# Patient Record
Sex: MALE
[De-identification: names, ages, dates, MRNs, and addresses within clinical notes are randomized per-mention and may not be internally consistent; named-entity substitution may affect disease eponyms.]

---

## 2020-08-28 ENCOUNTER — HOSPITAL ENCOUNTER (INPATIENT)
Dept: HOSPITAL 88 - ER | Age: 70
LOS: 3 days | Discharge: HOME | DRG: 432 | End: 2020-08-31
Attending: INTERNAL MEDICINE | Admitting: INTERNAL MEDICINE
Payer: MEDICARE

## 2020-08-28 VITALS — SYSTOLIC BLOOD PRESSURE: 119 MMHG | DIASTOLIC BLOOD PRESSURE: 74 MMHG

## 2020-08-28 VITALS — WEIGHT: 214 LBS | BODY MASS INDEX: 29.96 KG/M2 | HEIGHT: 71 IN

## 2020-08-28 VITALS — SYSTOLIC BLOOD PRESSURE: 101 MMHG | DIASTOLIC BLOOD PRESSURE: 63 MMHG

## 2020-08-28 VITALS — DIASTOLIC BLOOD PRESSURE: 63 MMHG | SYSTOLIC BLOOD PRESSURE: 101 MMHG

## 2020-08-28 VITALS — DIASTOLIC BLOOD PRESSURE: 74 MMHG | SYSTOLIC BLOOD PRESSURE: 119 MMHG

## 2020-08-28 DIAGNOSIS — F17.210: ICD-10-CM

## 2020-08-28 DIAGNOSIS — I50.42: ICD-10-CM

## 2020-08-28 DIAGNOSIS — G93.41: ICD-10-CM

## 2020-08-28 DIAGNOSIS — E11.9: ICD-10-CM

## 2020-08-28 DIAGNOSIS — I25.10: ICD-10-CM

## 2020-08-28 DIAGNOSIS — K74.60: Primary | ICD-10-CM

## 2020-08-28 DIAGNOSIS — R18.8: ICD-10-CM

## 2020-08-28 DIAGNOSIS — Z11.59: ICD-10-CM

## 2020-08-28 DIAGNOSIS — F10.21: ICD-10-CM

## 2020-08-28 DIAGNOSIS — Z95.1: ICD-10-CM

## 2020-08-28 DIAGNOSIS — Z79.4: ICD-10-CM

## 2020-08-28 DIAGNOSIS — E80.6: ICD-10-CM

## 2020-08-28 LAB
ALBUMIN SERPL-MCNC: 3 G/DL (ref 3.5–5)
ALBUMIN/GLOB SERPL: 0.7 {RATIO} (ref 0.8–2)
ALP SERPL-CCNC: 86 IU/L (ref 40–150)
ALT SERPL-CCNC: 14 IU/L (ref 0–55)
ANION GAP SERPL CALC-SCNC: 17.2 MMOL/L (ref 8–16)
BACTERIA URNS QL MICRO: (no result) /HPF
BASOPHILS # BLD AUTO: 0.1 10*3/UL (ref 0–0.1)
BASOPHILS NFR BLD AUTO: 1.3 % (ref 0–1)
BILIRUB UR QL: NEGATIVE
BUN SERPL-MCNC: 20 MG/DL (ref 7–26)
BUN/CREAT SERPL: 12 (ref 6–25)
CALCIUM SERPL-MCNC: 8.9 MG/DL (ref 8.4–10.2)
CHLORIDE SERPL-SCNC: 101 MMOL/L (ref 98–107)
CK MB SERPL-MCNC: 1.7 NG/ML (ref 0–5)
CK SERPL-CCNC: 46 IU/L (ref 30–200)
CLARITY UR: CLEAR
CO2 SERPL-SCNC: 25 MMOL/L (ref 22–29)
COLOR UR: YELLOW
DEPRECATED NEUTROPHILS # BLD AUTO: 5.9 10*3/UL (ref 2.1–6.9)
DEPRECATED RBC URNS MANUAL-ACNC: (no result) /HPF (ref 0–5)
EGFRCR SERPLBLD CKD-EPI 2021: 39 ML/MIN (ref 60–?)
EOSINOPHIL # BLD AUTO: 0.2 10*3/UL (ref 0–0.4)
EOSINOPHIL NFR BLD AUTO: 2.8 % (ref 0–6)
EPI CELLS URNS QL MICRO: (no result) /LPF
ERYTHROCYTE [DISTWIDTH] IN CORD BLOOD: 16.8 % (ref 11.7–14.4)
GLOBULIN PLAS-MCNC: 4.1 G/DL (ref 2.3–3.5)
GLUCOSE SERPLBLD-MCNC: 192 MG/DL (ref 74–118)
HCT VFR BLD AUTO: 37 % (ref 38.2–49.6)
HGB BLD-MCNC: 11.4 G/DL (ref 14–18)
HYALINE CASTS #/AREA URNS LPF: (no result) /[LPF] (ref 0–1)
KETONES UR QL STRIP.AUTO: (no result)
LEUKOCYTE ESTERASE UR QL STRIP.AUTO: NEGATIVE
LYMPHOCYTES # BLD: 0.6 10*3/UL (ref 1–3.2)
LYMPHOCYTES NFR BLD AUTO: 7.4 % (ref 18–39.1)
MCH RBC QN AUTO: 26.8 PG (ref 28–32)
MCHC RBC AUTO-ENTMCNC: 30.8 G/DL (ref 31–35)
MCV RBC AUTO: 87.1 FL (ref 81–99)
MONOCYTES # BLD AUTO: 0.6 10*3/UL (ref 0.2–0.8)
MONOCYTES NFR BLD AUTO: 8.5 % (ref 4.4–11.3)
NEUTS SEG NFR BLD AUTO: 79.7 % (ref 38.7–80)
NITRITE UR QL STRIP.AUTO: NEGATIVE
PLATELET # BLD AUTO: 290 X10E3/UL (ref 140–360)
POTASSIUM SERPL-SCNC: 4.2 MMOL/L (ref 3.5–5.1)
PROT UR QL STRIP.AUTO: NEGATIVE
RBC # BLD AUTO: 4.25 X10E6/UL (ref 4.3–5.7)
SODIUM SERPL-SCNC: 139 MMOL/L (ref 136–145)
SP GR UR STRIP: 1.02 (ref 1.01–1.02)
UROBILINOGEN UR STRIP-MCNC: 0.2 MG/DL (ref 0.2–1)
WBC #/AREA URNS HPF: (no result) /HPF (ref 0–5)

## 2020-08-28 PROCEDURE — 36415 COLL VENOUS BLD VENIPUNCTURE: CPT

## 2020-08-28 PROCEDURE — 74177 CT ABD & PELVIS W/CONTRAST: CPT

## 2020-08-28 PROCEDURE — 93005 ELECTROCARDIOGRAM TRACING: CPT

## 2020-08-28 PROCEDURE — 82550 ASSAY OF CK (CPK): CPT

## 2020-08-28 PROCEDURE — 76705 ECHO EXAM OF ABDOMEN: CPT

## 2020-08-28 PROCEDURE — 82553 CREATINE MB FRACTION: CPT

## 2020-08-28 PROCEDURE — 80053 COMPREHEN METABOLIC PANEL: CPT

## 2020-08-28 PROCEDURE — 83540 ASSAY OF IRON: CPT

## 2020-08-28 PROCEDURE — 83605 ASSAY OF LACTIC ACID: CPT

## 2020-08-28 PROCEDURE — 82948 REAGENT STRIP/BLOOD GLUCOSE: CPT

## 2020-08-28 PROCEDURE — 82390 ASSAY OF CERULOPLASMIN: CPT

## 2020-08-28 PROCEDURE — 82728 ASSAY OF FERRITIN: CPT

## 2020-08-28 PROCEDURE — 99284 EMERGENCY DEPT VISIT MOD MDM: CPT

## 2020-08-28 PROCEDURE — 86039 ANTINUCLEAR ANTIBODIES (ANA): CPT

## 2020-08-28 PROCEDURE — 84484 ASSAY OF TROPONIN QUANT: CPT

## 2020-08-28 PROCEDURE — 82746 ASSAY OF FOLIC ACID SERUM: CPT

## 2020-08-28 PROCEDURE — 81001 URINALYSIS AUTO W/SCOPE: CPT

## 2020-08-28 PROCEDURE — 83690 ASSAY OF LIPASE: CPT

## 2020-08-28 PROCEDURE — 87040 BLOOD CULTURE FOR BACTERIA: CPT

## 2020-08-28 PROCEDURE — 80076 HEPATIC FUNCTION PANEL: CPT

## 2020-08-28 PROCEDURE — 84466 ASSAY OF TRANSFERRIN: CPT

## 2020-08-28 PROCEDURE — 85025 COMPLETE CBC W/AUTO DIFF WBC: CPT

## 2020-08-28 PROCEDURE — 82607 VITAMIN B-12: CPT

## 2020-08-28 PROCEDURE — 85045 AUTOMATED RETICULOCYTE COUNT: CPT

## 2020-08-28 RX ADMIN — INSULIN LISPRO SCH UNIT: 100 INJECTION, SOLUTION INTRAVENOUS; SUBCUTANEOUS at 21:00

## 2020-08-28 NOTE — XMS REPORT
Continuity of Care Document

                             Created on: 2020



JUSTIN MACK

External Reference #: [id number]

: 1950

Sex: Male



Demographics





                          Address                   [address] [place]  [address]

 

                          Home Phone                [phone]

 

                          Preferred Language        English

 

                          Marital Status            Unknown

 

                          Mormonism Affiliation     Unknown

 

                          Race                      Unknown

 

                          Ethnic Group              Unknown





Author





                          Author                    [employer]

 JUSTIN Schaffer              [employer]

 [employer]

 

                          Address                   Unknown

 

                          Phone                     Unavailable







Care Team Providers





                    Care Team Member Name Role                Phone

 

                    [employer] Unavailable         Un

available



                                    



Problems

                    



                    Problem                         Status                      

   Onset Date       

                          Classification                         Date Reported  

         

                          Comments                         Source               

     

 

                    Carotid artery disease                         Active       

                    

                          Problem                         2016            

    

                                                    [facility]           

         

 

                    Hypercholesterolemia                         Active         

                    

                          Problem                         2016            

      

                                                    Shaneka Hazel           

         

 

                    Diabetes mellitus                         Active            

                    

                          Problem                         2016            

         

                                                    Shaneka Hazel           

         

 

                    Equivalent angina                         Active            

                    

                          Problem                         2016            

         

                                                    Shaneka Hazel           

         

 

                    CAD, Graft                         Active                   

                    

                    Problem                         2016                  

          

                                        Shaneka Hazel                    

 

                    Smoker                         Active                       

                    

                    Diagnosis                         2016                

              

                                        Shaneka Hazel                    

 

                          DJD (degenerative joint disease) of knee              

           Active         

                                             Diagnosis                         

2016                                                   Shaneka Hazel  

 

                

 

                          Subclavian artery stenosis, left                      

   Active                 

                                             Problem                               

                                                    Shaneka Hazel           

         

 

                    CVA                         Active                          

                    

                    Problem                         2016                  

                 

                                        Shaneka Hazel                    

 

                                        Atherosclerosis of native arteries of th

e extremities with intermittent 

claudication                         Active                                     

                    Problem                         2016                  

        

                                        Shaneka Hazel                    

 

                    Abnormal EKG                         Active                 

                    

                    Problem                         2016                  

        

                                        Shaneka Hazel                    

 

                          Benign hypertensive heart disease                     

    Active                

                                             Problem                              

                                                    Shaneka Hazel           

        



 

                          Long term current use of insulin                      

   Active                 

                                             Diagnosis                         0

2020    

                                                    hSaneka Hazel           

       

 

 

                          Patient unable to exercise                         Act

natanael                       

                                             Diagnosis                         0

2020          

                                                    Shaneka Hazel           

         

 

                          Subclavian arterial stenosis                         A

ctive                     

                                             Diagnosis                         0

2020        

                                                    Shaneka Hazel           

         

 

                          Type 2 diabetes mellitus with unspecified complication

s                         

Active                                                   Diagnosis              

 

                    2020                                                  

Shaneka Hazel                    

 

                          History of CEA (carotid endarterectomy)               

          Active          

                                             Diagnosis                         

2020                                                   Shaneka Hazel  

 

                

 

                          Osteoarthritis of knee, unspecified                   

      Active              

                                             Diagnosis                         0

2020 

                                                    Shaneka Hazel           

    

    

 

                          Nicotine dependence, unspecified, uncomplicated       

                  Active  

                                                    Diagnosis                   

     

                    2020                                                  

Shaneka Hazel   

                

 

                          Pure hypercholesterolemia, unspecified                

         Active           

                                             Diagnosis                         

2020                                                   Shaneka Hazel  

 

                

 

                          Hypertensive heart disease without heart failure      

                   Active 

                                                    Diagnosis                   

    

                    2020                                                  

Shaneka Hazel  

                 

 

                                        Atherosclerosis of coronary artery bypas

s graft of native heart with angina 

pectoris                         Active                                         

                    Problem                         2020                  

            

                                        Shaneka Hazel                    

 

                          Abnormal electrocardiogram [ECG] [EKG]                

         Active           

                                             Diagnosis                         

2020                                                   Shaneka Hazel  

 

                 

 

                          Occlusion and stenosis of bilateral carotid arteries  

                       

Active                                                   Diagnosis              

 

                    2020                                                  

Shaneka Hazel                    

 

                          Acute mitral insufficiency                         Act

natanael                       

                                             Problem                         2020           

                                                    Shaneka Hazel           

         

 

                          History of MI (myocardial infarction)                 

        Active            

                                             Problem                         2020

                                                    Shaneka Hazel           

  

       

 

                    Mitral valve disorders                         Active       

                    

                          Problem                         2020            

   

                                                    Shaneka Hazel           

         

 

                                        Atherosclerosis of native artery of both

 lower extremities with intermittent 

claudication                         Active                                     

                          Diagnosis                         2020          

             

                                                    Shaneka Hazel           

         

 

                          THOMPSON (dyspnea on exertion)                         Acti

ve                        

                                             Problem                         2020            

                                                    Shaneka Hazel           

         

 

                          Chronic systolic congestive heart failure             

            Active        

                                             Problem                         

2020                                                   Shaneka Hazel  

 

                 

 

                          Abnormal cardiovascular stress test                   

      Active              

                                             Problem                         2020  

                                                    Shaneka Hazel           

    

     

 

                          Other forms of angina pectoris                        

 Active                   

                                             Problem                         2020       

                                                    Shaneka Hazel           

         



                                                                                
                                                                                
                                                                                
                                                                                
                                                                                
                                                                                
                                                                                
        



Medications

                    



                    Medication                         Details                  

       Route        

                          Status                         Patient Instructions   

         

                          Ordering Provider                         Order Date  

             

                                        Source                    

 

                    Lasix                         1 tablet                      

   Orally           

                          Active                         40 MG Orally twice a da

y (bid)     

                          Yasemin                         2020            

      

                                        Shaneka Hazel                    

 

                    Levemir                         as directed                 

        Subcutaneous

                          Active                         100 UNIT/ML Subcutaneou

s

 20 in AM and 30 units in PM                         Yasemin Hazel           

         

 

                          Glimepiride                         1 tablet with bethany

kfast or the first main 

meal of the day                         Orally                         Active   

                          4 MG Orally Once a day                         Yasemin Hazel           

      

   

 

                          Metoprolol Tartrate                         1/2 half t

ablet                     

                    Orally                         Active                       

  25 MG Orally 

Twice a day                         Yasemin Hazel                    

 

                    Clopidogrel Bisulfate                         1 tablet      

                   

Orally                         Active                         75 MG Orally Once 

a day                         Yasemin Hazel                    

 

                    Aspirin EC                         1 tablet                 

        Orally      

                          Active                         81 MG Orally Once a day

       

                    Yasemin Hazel                    

 

                    Atorvastatin Calcium                         1 tablet       

                  

Orally                         Active                         40 MG Orally Once 

a day                         Yasemin Hazel                    

 

                    Lisinopril                         1 tablet                 

        Orally      

                          Active                         10 MG Orally Once a day

       

                    Yasemin Hazel                    

 

                    Clopidogrel Bisulfate                         1 tablet      

                   

Orally                         Active                         75 MG Orally Once 

a day                         Yasemin Hazel                    

 

                    Aspirin EC                         1 tablet                 

        Orally      

                          Active                         81 MG Orally Once a day

       

                    Yasemin Hazel                    

 

                          Glimepiride                         1 tablet with bethany

kfast or the first main 

meal of the day                         Orally                         Active   

                          4 MG Orally Once a day                         Meekfrankie Hazel           

      

   

 

                          Metoprolol Tartrate                         1/2 half t

ablet                     

                    Orally                         Active                       

  25 MG Orally 

Twice a day                         Meekfrankie Hazel                    

 

                    Levemir                         as directed                 

        Subcutaneous

                          Active                         100 UNIT/ML Subcutaneou

s

 20 in AM and 30 units in PM                         Meekfrankie Hazel           

         

 

                    Atorvastatin Calcium                         1 tablet       

                  

Orally                         Active                         40 MG Orally Once 

a day                         Meekfrankie Hazel                    

 

                    Lisinopril                         1 tablet                 

        Orally      

                          Active                         10 MG Orally Once a day

       

                    Meekfrankie Hazel                    

 

                    Aspir-81                         1 tablet                   

      Orally        

                          Active                         81 MG Orally as needed 

(prn)    

                     [facility]                                                  

Shaneka Hazel                    



                                                                                
                                                                                
                                                                                
                                                                                
    



Allergies, Adverse Reactions, Alerts

                    



                    Substance                         Category                  

       Reaction     

                          Severity                         Reaction type        

      

                    Status                         Date Reported                

         

Comments                                Source                    

 

                    N.K.D.A.                         Adverse Reaction           

              Info 

Not Available                                                   Adverse Reaction

 

                                                    2019                  

   

                                                    Shaneka Hazel           

         



                                                        



Immunizations

        



                                        No Data Provided for This Section



                                     



Results





                                        No Data Provided for This Section



                    



Pathology Reports





                                        No Data Provided for This Section       

             



                            



Diagnostic Reports

            



                                        No Data Provided for This Section       

             



                                                            



Consultation Notes

                    



                                        No Data Provided for This Section       

             



                                                            



Discharge Summaries

                    



                                        No Data Provided for This Section       

             



                                                            



History and Physicals

                    



                                        No Data Provided for This Section       

             



                                                                



Vital Signs

                     



                    Vital Sign                         Value                    

     Date           

                          Comments                         Source               

     

 

                    Weight                         236                          

2020          

                                                    [facility]           

         

 

                    Height                         71                          0

2020           

                                                    [facility]           

         

 

                    Temperature Oral (F)                         97.0 F         

                

2020                                                   [facility]  

 

                 

 

                    Heart Rate                         100                      

    2020      

                                                    [facility]           

        

 

 

                    Diastolic (mm Hg)                         80                

          2020

                                                    [facility]           

  

       

 

                    Systolic (mm Hg)                         128                

          2020

                                                    [facility]           

  

       

 

                    Weight                         204                          

2019          

                                                    [facility] [place] [facility]           

         

 

                    Height                         71                          0

2019           

                                                    [facility]           

         

 

                    Temperature Oral (F)                         97.0 F         

                

2019                                                   [facility]  

 

                 

 

                    Heart Rate                         89                       

   2019       

                                                    [facility]           

         

 

                    Diastolic (mm Hg)                         80                

          2019

                                                    [facility]           

  

       

 

                    Systolic (mm Hg)                         122                

          2019

                                                    [facility]           

  

       

 

                    Weight                         206                          

2019          

                                                    [facility] [place] [place] [facility]           

         

 

                    Height                         71                          0

2019           

                                                    [facility]           

         

 

                    Temperature Oral (F)                         96.7 F         

                

2019                                                   [facility]  

 

                 

 

                    Heart Rate                         72                       

   2019       

                                                    [facility]           

         

 

                    Diastolic (mm Hg)                         80                

          2019

                                                    [facility]           

  

       

 

                    Systolic (mm Hg)                         128                

          2019

                                                    [facility]           

  

       

 

                    Weight                         207                          

2019          

                                                    [facility]           

         

 

                    Height                         71                          0

2019           

                                                    [facility]           

         

 

                    Temperature Oral (F)                         96.2 F         

                

2019                                                   [facility]  

 

                 

 

                    Heart Rate                         72                       

   2019       

                                                    [facility]           

         

 

                    Diastolic (mm Hg)                         80                

          2019

                                                    [facility]           

  

       

 

                    Systolic (mm Hg)                         146                

          2019

                                                    [facility]           

  

       

 

                    Weight                         210                          

2019          

                                                    Mohamed O Jeroudi           

         

 

                    Height                         71                          0

2019           

                                                    Mohamed O Jeroudi           

         

 

                    Temperature Oral (F)                         96.1 F         

                

2019                                                   Mohamed O Jeroudi  

 

                 

 

                    Heart Rate                         72                       

   2019       

                                                    Mohamed O Jeroudi           

         

 

                    Diastolic (mm Hg)                         82                

          2019

                                                    Mohamed O Jeroudi           

  

       

 

                    Systolic (mm Hg)                         158                

          2019

                                                    Mohamed O Jeroudi           

  

       

 

                    Weight                         214                          

2015          

                                                    Mohamed O Jeroudi           

         

 

                    Height                         71                          0

2015           

                                                    Mohamed O Jeroudi           

         

 

                    Temperature Oral (F)                         97.9 F         

                

2015                                                   Mohamed O Jeroudi  

 

                 

 

                    Heart Rate                         72                       

   2015       

                                                    Mohamed O Jeroudi           

         

 

                    Diastolic (mm Hg)                         70                

          2015

                                                    Mohamed O Jeroudi           

  

       

 

                    Systolic (mm Hg)                         134                

          2015

                                                    Mohamed O Jeroudi           

  

       

 

                    Weight                         215                          

2015          

                                                    Mohamed O Jeroudi           

         

 

                    Height                         71                          0

2015           

                                                    Mohamed O Jeroudi           

         

 

                    Temperature Oral (F)                         97.2 F         

                

2015                                                   Mohamed O Jeroudi  

 

                 

 

                    Heart Rate                         72                       

   2015       

                                                    Mohamed O Jeroudi           

         

 

                    Diastolic (mm Hg)                         85                

          2015

                                                    Mohamed O Jeroudi           

  

       

 

                    Systolic (mm Hg)                         140                

          2015

                                                    Mohamed O Jeroudi           

  

       

 

                    Weight                         212                          

2015          

                                                    Mohamed O Jeroudi           

         

 

                    Height                         71                          0

2015           

                                                    Mohamed O Jeroudi           

         

 

                    Temperature Oral (F)                         96.7 F         

                

2015                                                   Mohamed O Jeroudi  

 

                 

 

                    Heart Rate                         72                       

   2015       

                                                    Shaneka Hazel           

         

 

                    Diastolic (mm Hg)                         65                

          2015

                                                    Shaneka Hazel           

  

       

 

                    Systolic (mm Hg)                         130                

          2015

                                                    Shaneka Hazel           

  

       



                                                                                
                                                                                
                                                                                
                                                                                
                                                                                
                                                                                
                                                                                
                                                                                
                                                                                
                                                                                
                                        



Encounters

                    



                    Location                         Location Details           

              

Encounter Type                         Encounter Number                         

Reason For Visit                         Attending Provider                     

                    ADM Date                         DC Date                    

     Status     

                                        Source                    

 

                    MD JUANJOSE Farrell                                   

               

hospital Follow up                         [id number] 

                                                                            

2015                                   

                                        MD JUANJOSE Mcclure                                   

               

hospital Follow up                         [id number] 

                                                                            

2015                                   

                                        MD JUANJOSE Mcclure                                   

               

[facility] [place] Follow [facility]                         [id number] 

                                                                            

2015                                   

                                        MD JUANJOSE Mcclure                                   

               

Unknown                                 [id number]    

        

                                                                        20

15     

                    2015                                                  

MD JUANJOSE Mcclure                                   

               

Unknown                                 [id number]    

        

                                                                        20

15     

                    2015                                                  

MD JUANJOSE Mcclure                                   

               

Unknown                                 [id number]    

        

                                                                        20

15     

                    2015                                                  

MD JUANJOSE Mcclure                                   

               [facility] 

FOLLOW UP                               [id number]    

      

                                                                        20

15   

                          2015                                            

    

                                        MD JUANJOSE Mcclure                                   

               [facility] 

FOLLOW UP                               [id number]    

      

                                                                        20

15   

                          2015                                            

    

                                        MD JUANJOSE Mcclure                                   

               [facility] 

FOLLOW UP                               [id number]    

      

                                                                        20

15   

                          2015                                            

    

                                        Shaneka Hazel                    



                                                                                
                                                                                
        



Procedures

        



                                        No Data Provided for This Section



                                                    



Assessment and Plan

                    



                                        No Data Provided for This Section       

             



                                     



Plan of Care





                                        No Data Provided for This Section       

             



                                                                



Social History

                    



                    Social History                         Date                 

        Source      

              

 

                                        Social History ElementQualifiersDate Rep

orted

Smoking

                                        .  Status Current Smoker  6 cig/ 24 hour

s

Aug 25, 2015

Alcohol Use

Yes.  Socially

Aug 25, 2015

Alcohol Screening:

Yes.  Did you have a drink containing alcohol in the past year? Yes , Points 4 ,
 How often did you have a drink containing alcohol in the past year? Two to four
 times a month (2 points) , How many drinks did you have on a typical day when 
you were drinking in the past year? 3 or 4 (1 point) , How often did you have 
six or more drinks on one occasion in the past year? Less than monthly (1 point)
 

Aug 25, 2015

Marital Status:

.  

Aug 25, 2015

Do you drink alcohol?

Yes.  

Aug 25, 2015

Occupation:

                                        .  Retired  

Aug 25, 2015

                          2015                         Shaneka Hazel  

 

                 



                                                                        



Family History

                    



                    Value                         Date                         S

ource               

     

 

                                        QualifierDescriptionCommentDate Reported

Maternal Grandmother

Comment not available

Aug 24, 2015

Paternal Grandmother

Comment not available

Aug 24, 2015

Siblings

Comment not available

Aug 24, 2015

Maternal Grandfather

Comment not available

Aug 24, 2015

Children

Comment not available

Aug 24, 2015

Father



MI, HTN,DM, Hypercholesterolemia

Aug 24, 2015

Paternal Grandfather

Comment not available

Aug 24, 2015

Mother



Lung cancer, Hypercholesterolemia

Aug 24, 2015

Other:

Comment not available

Aug 24, 2015

                          2016                         Shaneka Hazel  

 

                 

 

                                        QualifierDescriptionCommentDate Reported

Maternal Grandmother

Comment not available

Aug 24, 2015

Paternal Grandmother

Comment not available

Aug 24, 2015

Siblings

Comment not available

Aug 24, 2015

Maternal Grandfather

Comment not available

Aug 24, 2015

Children

Comment not available

Aug 24, 2015

Father



MI, HTN,DM, Hypercholesterolemia

Aug 24, 2015

Paternal Grandfather

Comment not available

Aug 24, 2015

Mother



Lung cancer, Hypercholesterolemia

Aug 24, 2015

Other:

Comment not available

Aug 24, 2015

                          2016                         Shaneka Hazel  

 

                 

 

                                        QualifierDescriptionCommentDate Reported

Maternal Grandmother

Comment not available

Aug 24, 2015

Paternal Grandmother

Comment not available

Aug 24, 2015

Siblings

Comment not available

Aug 24, 2015

Maternal Grandfather

Comment not available

Aug 24, 2015

Children

Comment not available

Aug 24, 2015

Father



MI, HTN,DM, Hypercholesterolemia

Aug 24, 2015

Paternal Grandfather

Comment not available

Aug 24, 2015

Mother



Lung cancer, Hypercholesterolemia

Aug 24, 2015

Other:

Comment not available

Aug 24, 2015

                          2016                         Shaneka Hazel  

 

                 



                                                                                
                    



Advance Directives

                    



                                        No Data Provided for This Section       

             



                                                            



Functional Status

                    



                                        No Data Provided for This Section

## 2020-08-28 NOTE — NUR
PATIENT ARRIVED ON THE UNIT AT 1720 PER WHEELCHAIR FROM THE ER. PATIENT IS AWAKE, ALERT, AND 
IN STABLE CONDITION WITH NO S/S OF RESPIRATORY DISTRESS. PATIENT DENIES PAIN AT THIS TIME 
AND STATES PAIN OCCURS UPON TOUCHING HIS RIGHT LOWER ABD. JAUNDICE NOTED TO BILATERAL 
SCLERAS AND SKIN AREA. SKINS INTACT. EDEMA NOTED TO BILATERAL LOWER EXTREMITIES 2+. ABD IS 
DISTENDED AND TENDER TO RIGHT SIDE/LOWER QUADRANT. TELEMETRY #17 APPLIED. CALL LIGHT IS 
WITHIN REACH, PATIENT INSTRUCTED TO CALL FOR ASSISTANCE AS NEEDED.

## 2020-08-28 NOTE — NUR
PATIENT IS IN STABLE CONDITION WITH NO S/S OF RESPIRATORY DISTRESS. NO PAIN VOICED. PATIENT 
STATES HE IS HUNGRY AT THIS TIME AND WAS INFORMED BY RN THAT A CALL HAS BEEN PLACED OUT TO 
THE ATTENDING PHYSICIAN. AWAITING CALLBACK. TELEMETRY APPLIED.  CALL LIGHT IS WITHIN REACH, 
PATIENT INSTRUCTED TO CALL FOR ASSISTANCE AS NEEDED. REPORT GIVEN TO ONCOMING NURSE.

## 2020-08-28 NOTE — XMS REPORT
Continuity of Care Document

                             Created on: 2020



JUSTIN MACK

External Reference #: [id number]

: 1950

Sex: Male



Demographics





                          Address                   [address] [place]  [address]

 

                          Home Phone                [phone]

 

                          Preferred Language        English

 

                          Marital Status            Unknown

 

                          Sikhism Affiliation     Unknown

 

                          Race                      Unknown

 

                          Ethnic Group              Unknown





Author





                          Author                    Lu HihoCoder

 JUSTIN Schaffer              [employer]

 [employer]

 

                          Address                   Unknown

 

                          Phone                     Unavailable







Care Team Providers





                    Care Team Member Name Role                Phone

 

                    [employer] Unavailable         Un

available



                                    



Problems

                    



                    Problem                         Status                      

   Onset Date       

                          Classification                         Date Reported  

         

                          Comments                         Source               

     

 

                    Carotid artery disease                         Active       

                    

                          Problem                         2016            

    

                                                    [facility]el           

         

 

                    Hypercholesterolemia                         Active         

                    

                          Problem                         2016            

      

                                                    Shaneka Hazel           

         

 

                    Diabetes mellitus                         Active            

                    

                          Problem                         2016            

         

                                                    Shaneka Hazel           

         

 

                    Equivalent angina                         Active            

                    

                          Problem                         2016            

         

                                                    Shaneka Hazel           

         

 

                    CAD, Graft                         Active                   

                    

                    Problem                         2016                  

          

                                        Shaneka Hazel                    

 

                    Smoker                         Active                       

                    

                    Diagnosis                         2016                

              

                                        Shaneka Hazel                    

 

                          DJD (degenerative joint disease) of knee              

           Active         

                                             Diagnosis                         

2016                                                   Shaneka Hazel  

 

                

 

                          Subclavian artery stenosis, left                      

   Active                 

                                             Problem                               

                                                    Shaneka Hazel           

         

 

                    CVA                         Active                          

                    

                    Problem                         2016                  

                 

                                        Shaneka Hazel                    

 

                                        Atherosclerosis of native arteries of th

e extremities with intermittent 

claudication                         Active                                     

                    Problem                         2016                  

        

                                        Shaneka Hazel                    

 

                    Abnormal EKG                         Active                 

                    

                    Problem                         2016                  

        

                                        Shaneka Hazel                    

 

                          Benign hypertensive heart disease                     

    Active                

                                             Problem                              

                                                    Shaneka Hazel           

        



 

                          Long term current use of insulin                      

   Active                 

                                             Diagnosis                         0

2020    

                                                    Shaneka Hazel           

       

 

 

                          Patient unable to exercise                         Act

natanael                       

                                             Diagnosis                         0

2020          

                                                    Shaneka Hazel           

         

 

                          Subclavian arterial stenosis                         A

ctive                     

                                             Diagnosis                         0

2020        

                                                    Shaneka Hazel           

         

 

                          Type 2 diabetes mellitus with unspecified complication

s                         

Active                                                   Diagnosis              

 

                    2020                                                  

Shaneka Hazel                    

 

                          History of CEA (carotid endarterectomy)               

          Active          

                                             Diagnosis                         

2020                                                   Shaneka Hazel  

 

                

 

                          Osteoarthritis of knee, unspecified                   

      Active              

                                             Diagnosis                         0

2020 

                                                    Shaneka Hazel           

    

    

 

                          Nicotine dependence, unspecified, uncomplicated       

                  Active  

                                                    Diagnosis                   

     

                    2020                                                  

Shaneka Hazel   

                

 

                          Pure hypercholesterolemia, unspecified                

         Active           

                                             Diagnosis                         

2020                                                   Shaneka Hazel  

 

                

 

                          Hypertensive heart disease without heart failure      

                   Active 

                                                    Diagnosis                   

    

                    2020                                                  

Shaneka Hazel  

                 

 

                                        Atherosclerosis of coronary artery bypas

s graft of native heart with angina 

pectoris                         Active                                         

                    Problem                         2020                  

            

                                        Shaneka Hazel                    

 

                          Abnormal electrocardiogram [ECG] [EKG]                

         Active           

                                             Diagnosis                         

2020                                                   Shaneka Hazel  

 

                 

 

                          Occlusion and stenosis of bilateral carotid arteries  

                       

Active                                                   Diagnosis              

 

                    2020                                                  

Shaneka Hazel                    

 

                          Acute mitral insufficiency                         Act

natanael                       

                                             Problem                         2020           

                                                    Shaneka Hazel           

         

 

                          History of MI (myocardial infarction)                 

        Active            

                                             Problem                         2020

                                                    Shaneka Hazel           

  

       

 

                    Mitral valve disorders                         Active       

                    

                          Problem                         2020            

   

                                                    Shaneka Hazel           

         

 

                                        Atherosclerosis of native artery of both

 lower extremities with intermittent 

claudication                         Active                                     

                          Diagnosis                         2020          

             

                                                    Shaneka Hazel           

         

 

                          THOMPSON (dyspnea on exertion)                         Acti

ve                        

                                             Problem                         2020            

                                                    Shaneka Hazel           

         

 

                          Chronic systolic congestive heart failure             

            Active        

                                             Problem                         

2020                                                   Shaneka Hazel  

 

                 

 

                          Abnormal cardiovascular stress test                   

      Active              

                                             Problem                         2020  

                                                    Shaneka Hazel           

    

     

 

                          Other forms of angina pectoris                        

 Active                   

                                             Problem                         2020       

                                                    Shaneka Hazel           

         



                                                                                
                                                                                
                                                                                
                                                                                
                                                                                
                                                                                
                                                                                
        



Medications

                    



                    Medication                         Details                  

       Route        

                          Status                         Patient Instructions   

         

                          Ordering Provider                         Order Date  

             

                                        Source                    

 

                    Lasix                         1 tablet                      

   Orally           

                          Active                         40 MG Orally twice a da

y (bid)     

                          Yasemin                         2020            

      

                                        Shaneka Hazel                    

 

                    Levemir                         as directed                 

        Subcutaneous

                          Active                         100 UNIT/ML Subcutaneou

s

 20 in AM and 30 units in PM                         Yasemin Hazel           

         

 

                          Glimepiride                         1 tablet with bethany

kfast or the first main 

meal of the day                         Orally                         Active   

                          4 MG Orally Once a day                         Yasemin Hazel           

      

   

 

                          Metoprolol Tartrate                         1/2 half t

ablet                     

                    Orally                         Active                       

  25 MG Orally 

Twice a day                         Yasemin Hazel                    

 

                    Clopidogrel Bisulfate                         1 tablet      

                   

Orally                         Active                         75 MG Orally Once 

a day                         Yasemin Hazel                    

 

                    Aspirin EC                         1 tablet                 

        Orally      

                          Active                         81 MG Orally Once a day

       

                    Yasemin Hazel                    

 

                    Atorvastatin Calcium                         1 tablet       

                  

Orally                         Active                         40 MG Orally Once 

a day                         Yasemin Hazel                    

 

                    Lisinopril                         1 tablet                 

        Orally      

                          Active                         10 MG Orally Once a day

       

                    Yasemin Hazel                    

 

                    Clopidogrel Bisulfate                         1 tablet      

                   

Orally                         Active                         75 MG Orally Once 

a day                         Yasemin Hazel                    

 

                    Aspirin EC                         1 tablet                 

        Orally      

                          Active                         81 MG Orally Once a day

       

                    Yasemin Hazel                    

 

                          Glimepiride                         1 tablet with bethany

kfast or the first main 

meal of the day                         Orally                         Active   

                          4 MG Orally Once a day                         Meekfrankie Hazel           

      

   

 

                          Metoprolol Tartrate                         1/2 half t

ablet                     

                    Orally                         Active                       

  25 MG Orally 

Twice a day                         Meekfrankie Hazel                    

 

                    Levemir                         as directed                 

        Subcutaneous

                          Active                         100 UNIT/ML Subcutaneou

s

 20 in AM and 30 units in PM                         Meekfrankie Hazel           

         

 

                    Atorvastatin Calcium                         1 tablet       

                  

Orally                         Active                         40 MG Orally Once 

a day                         Meekfrankie Hazel                    

 

                    Lisinopril                         1 tablet                 

        Orally      

                          Active                         10 MG Orally Once a day

       

                    Meekfrankie Hazel                    

 

                    Aspir-81                         1 tablet                   

      Orally        

                          Active                         81 MG Orally as needed 

(prn)    

                     [facility]                                                  

Shaneka Hazel                    



                                                                                
                                                                                
                                                                                
                                                                                
    



Allergies, Adverse Reactions, Alerts

                    



                    Substance                         Category                  

       Reaction     

                          Severity                         Reaction type        

      

                    Status                         Date Reported                

         

Comments                                Source                    

 

                    N.K.D.A.                         Adverse Reaction           

              Info 

Not Available                                                   Adverse Reaction

 

                                                    2019                  

   

                                                    Shaneka Hazel           

         



                                                        



Immunizations

        



                                        No Data Provided for This Section



                                     



Results





                                        No Data Provided for This Section



                    



Pathology Reports





                                        No Data Provided for This Section       

             



                            



Diagnostic Reports

            



                                        No Data Provided for This Section       

             



                                                            



Consultation Notes

                    



                                        No Data Provided for This Section       

             



                                                            



Discharge Summaries

                    



                                        No Data Provided for This Section       

             



                                                            



History and Physicals

                    



                                        No Data Provided for This Section       

             



                                                                



Vital Signs

                     



                    Vital Sign                         Value                    

     Date           

                          Comments                         Source               

     

 

                    Weight                         236                          

2020          

                                                    [facility]           

         

 

                    Height                         71                          0

2020           

                                                    [facility]           

         

 

                    Temperature Oral (F)                         97.0 F         

                

2020                                                   [facility]  

 

                 

 

                    Heart Rate                         100                      

    2020      

                                                    [facility] O Jeroudi           

        

 

 

                    Diastolic (mm Hg)                         80                

          2020

                                                    [facility] O Jeroudi           

  

       

 

                    Systolic (mm Hg)                         128                

          2020

                                                    [facility] O Meekoudi           

  

       

 

                    Weight                         204                          

2019          

                                                    [facility]           

         

 

                    Height                         71                          0

2019           

                                                    [facility]oudi           

         

 

                    Temperature Oral (F)                         97.0 F         

                

2019                                                   [facility] O Jeroudi  

 

                 

 

                    Heart Rate                         89                       

   2019       

                                                    [facility] O Jeroudi           

         

 

                    Diastolic (mm Hg)                         80                

          2019

                                                    [facility] O Jeroudi           

  

       

 

                    Systolic (mm Hg)                         122                

          2019

                                                    [facility]art[facility] O Meekoudi           

  

       

 

                    Weight                         206                          

2019          

                                                    [facility]           

         

 

                    Height                         71                          0

2019           

                                                    [facility] O Jeroudi           

         

 

                    Temperature Oral (F)                         96.7 F         

                

2019                                                   [facility] O Jeroudi  

 

                 

 

                    Heart Rate                         72                       

   2019       

                                                    [facility] O Jeroudi           

         

 

                    Diastolic (mm Hg)                         80                

          2019

                                                    [facility] O Jeroudi           

  

       

 

                    Systolic (mm Hg)                         128                

          2019

                                                    [facility] O Jeroudi           

  

       

 

                    Weight                         207                          

2019          

                                                    [facility] O Jeroudi           

         

 

                    Height                         71                          0

2019           

                                                    [facility] O Jeroudi           

         

 

                    Temperature Oral (F)                         96.2 F         

                

2019                                                   [facility] O Jeroudi  

 

                 

 

                    Heart Rate                         72                       

   2019       

                                                    [facility] O Jeroudi           

         

 

                    Diastolic (mm Hg)                         80                

          2019

                                                    [facility] O Jeroudi           

  

       

 

                    Systolic (mm Hg)                         146                

          2019

                                                    [facility]amed O Jeroudi           

  

       

 

                    Weight                         210                          

2019          

                                                    Mohamed O Jeroudi           

         

 

                    Height                         71                          0

2019           

                                                    Mohamed O Jeroudi           

         

 

                    Temperature Oral (F)                         96.1 F         

                

2019                                                   Mohamed O Jeroudi  

 

                 

 

                    Heart Rate                         72                       

   2019       

                                                    Mohamed O Jeroudi           

         

 

                    Diastolic (mm Hg)                         82                

          2019

                                                    Mohamed O Jeroudi           

  

       

 

                    Systolic (mm Hg)                         158                

          2019

                                                    Mohamed O Jeroudi           

  

       

 

                    Weight                         214                          

2015          

                                                    Mohamed O Jeroudi           

         

 

                    Height                         71                          0

2015           

                                                    Mohamed O Jeroudi           

         

 

                    Temperature Oral (F)                         97.9 F         

                

2015                                                   Mohamed O Jeroudi  

 

                 

 

                    Heart Rate                         72                       

   2015       

                                                    Mohamed O Jeroudi           

         

 

                    Diastolic (mm Hg)                         70                

          2015

                                                    Mohamed O Jeroudi           

  

       

 

                    Systolic (mm Hg)                         134                

          2015

                                                    Mohamed O Jeroudi           

  

       

 

                    Weight                         215                          

2015          

                                                    Mohamed O Jeroudi           

         

 

                    Height                         71                          0

2015           

                                                    Mohamed O Jeroudi           

         

 

                    Temperature Oral (F)                         97.2 F         

                

2015                                                   Mohamed O Jeroudi  

 

                 

 

                    Heart Rate                         72                       

   2015       

                                                    Mohamed O Jeroudi           

         

 

                    Diastolic (mm Hg)                         85                

          2015

                                                    Mohamed O Jeroudi           

  

       

 

                    Systolic (mm Hg)                         140                

          2015

                                                    Mohamed O Jeroudi           

  

       

 

                    Weight                         212                          

2015          

                                                    Mohamed O Jeroudi           

         

 

                    Height                         71                          0

2015           

                                                    Mohamed O Jeroudi           

         

 

                    Temperature Oral (F)                         96.7 F         

                

2015                                                   Mohamed O Jeroudi  

 

                 

 

                    Heart Rate                         72                       

   2015       

                                                    Shaneka Hazel           

         

 

                    Diastolic (mm Hg)                         65                

          2015

                                                    Shaneka Hazel           

  

       

 

                    Systolic (mm Hg)                         130                

          2015

                                                    Shaneka Hazel           

  

       



                                                                                
                                                                                
                                                                                
                                                                                
                                                                                
                                                                                
                                                                                
                                                                                
                                                                                
                                                                                
                                        



Encounters

                    



                    Location                         Location Details           

              

Encounter Type                         Encounter Number                         

Reason For Visit                         Attending Provider                     

                    ADM Date                         DC Date                    

     Status     

                                        Source                    

 

                    MD JUANJOSE Farrell                                   

               

hospital Follow up                         [id number] 

                                                                            

2015                                   

                                        MD JUANJOSE Mcclure                                   

               

hospital Follow up                         [id number] 

                                                                            

2015                                   

                                        MD JUANJOSE Mcclure                                   

               

[facility] Follow [facility]                         [id number] 

                                                                            

2015                                   

                                        MD JUANJOSE Mcclure                                   

               

Unknown                                 [id number]    

        

                                                                        20

15     

                    2015                                                  

MD JUANJOSE Mcclure                                   

               

Unknown                                 [id number]    

        

                                                                        20

15     

                    2015                                                  

MD JUANJOSE Mcclure                                   

               

Unknown                                 [id number]    

        

                                                                        20

15     

                    2015                                                  

MD JUANJOSE Mcclure                                   

               [facility] 

FOLLOW UP                               [id number]    

      

                                                                        20

15   

                          2015                                            

    

                                        MD JUANJOSE Mcclure                                   

               [facility] 

FOLLOW UP                               [id number]    

      

                                                                        20

15   

                          2015                                            

    

                                        MD JUANJOSE Mcclure                                   

               [facility] 

FOLLOW UP                               [id number]    

      

                                                                        20

15   

                          2015                                            

    

                                        Shaneka Hazel                    



                                                                                
                                                                                
        



Procedures

        



                                        No Data Provided for This Section



                                                    



Assessment and Plan

                    



                                        No Data Provided for This Section       

             



                                     



Plan of Care





                                        No Data Provided for This Section       

             



                                                                



Social History

                    



                    Social History                         Date                 

        Source      

              

 

                                        Social History ElementQualifiersDate Rep

orted

Smoking

                                        .  Status Current Smoker  6 cig/ 24 hour

s

Aug 25, 2015

Alcohol Use

Yes.  Socially

Aug 25, 2015

Alcohol Screening:

Yes.  Did you have a drink containing alcohol in the past year? Yes , Points 4 ,
 How often did you have a drink containing alcohol in the past year? Two to four
 times a month (2 points) , How many drinks did you have on a typical day when 
you were drinking in the past year? 3 or 4 (1 point) , How often did you have 
six or more drinks on one occasion in the past year? Less than monthly (1 point)
 

Aug 25, 2015

Marital Status:

.  

Aug 25, 2015

Do you drink alcohol?

Yes.  

Aug 25, 2015

Occupation:

                                        .  Retired  

Aug 25, 2015

                          2015                         Shaneka Hazel  

 

                 



                                                                        



Family History

                    



                    Value                         Date                         S

ource               

     

 

                                        QualifierDescriptionCommentDate Reported

Maternal Grandmother

Comment not available

Aug 24, 2015

Paternal Grandmother

Comment not available

Aug 24, 2015

Siblings

Comment not available

Aug 24, 2015

Maternal Grandfather

Comment not available

Aug 24, 2015

Children

Comment not available

Aug 24, 2015

Father



MI, HTN,DM, Hypercholesterolemia

Aug 24, 2015

Paternal Grandfather

Comment not available

Aug 24, 2015

Mother



Lung cancer, Hypercholesterolemia

Aug 24, 2015

Other:

Comment not available

Aug 24, 2015

                          2016                         Shaneka Hazel  

 

                 

 

                                        QualifierDescriptionCommentDate Reported

Maternal Grandmother

Comment not available

Aug 24, 2015

Paternal Grandmother

Comment not available

Aug 24, 2015

Siblings

Comment not available

Aug 24, 2015

Maternal Grandfather

Comment not available

Aug 24, 2015

Children

Comment not available

Aug 24, 2015

Father



MI, HTN,DM, Hypercholesterolemia

Aug 24, 2015

Paternal Grandfather

Comment not available

Aug 24, 2015

Mother



Lung cancer, Hypercholesterolemia

Aug 24, 2015

Other:

Comment not available

Aug 24, 2015

                          2016                         Shaneka Hazel  

 

                 

 

                                        QualifierDescriptionCommentDate Reported

Maternal Grandmother

Comment not available

Aug 24, 2015

Paternal Grandmother

Comment not available

Aug 24, 2015

Siblings

Comment not available

Aug 24, 2015

Maternal Grandfather

Comment not available

Aug 24, 2015

Children

Comment not available

Aug 24, 2015

Father



MI, HTN,DM, Hypercholesterolemia

Aug 24, 2015

Paternal Grandfather

Comment not available

Aug 24, 2015

Mother



Lung cancer, Hypercholesterolemia

Aug 24, 2015

Other:

Comment not available

Aug 24, 2015

                          2016                         Shaneka Hazel  

 

                 



                                                                                
                    



Advance Directives

                    



                                        No Data Provided for This Section       

             



                                                            



Functional Status

                    



                                        No Data Provided for This Section

## 2020-08-28 NOTE — DIAGNOSTIC IMAGING REPORT
CT of the abdomen and pelvis, with contrast.    



History: Right upper quadrant abdominal pain.



Comparison: None available.



Technique: Multidetector CT scanning of the abdomen and pelvis was performed

from the level of the lung bases to the inferior pubic rami after intravenous

administration of contrast.  Coronal and sagittal multiplanar reformations were

obtained.



RADIATION DOSE:

     Total DLP: 751.24 mGy*cm

     Dose modulation, iterative reconstruction, and/or weight based adjustment

of the mA/kV was utilized to reduce the radiation dose to as low as reasonably

achievable. 



FINDINGS:

Areas of scattered subsegmental atelectasis noted within the visualized lung

bases. Atherosclerotic calcifications noted within the visualized coronary

arteries.



There is a moderate volume of simple-appearing abdominopelvic ascites present.



The liver is normal in size and attenuation but demonstrates a subtle

micronodular contour which can be seen in setting of hepatic dysfunction. No

focal hepatic abnormality is identified on the single phase examination. There

is reflux of contrast material into the hepatic veins which is nonspecific but

can be seen in the setting of right-sided heart dysfunction. The gallbladder is

not dilated. There is no evidence for radiopaque stone or wall thickening.

There is no intra or extrahepatic biliary ductal dilatation. The stomach,

spleen, pancreas, and bilateral adrenal glands are unremarkable.



The kidneys are normal in size and location and enhance symmetrically. Vascular

calcifications are noted on the left. There is no evidence for nephrolithiasis

or hydronephrosis. No ureteral stone or dilatation is appreciated. 



The abdominal aorta is normal in course and caliber with extensive

atherosclerotic calcifications. Significant atherosclerotic plaquing noted at

the origins of the celiac axis, SMA, left renal artery, and MERCEDES. The IVC is

unremarkable. The portal venous system, SMV, and splenic vein appear patent.



Please note evaluation of bowel is limited without the use of enteric contrast

material. Mild wall thickening noted of a loop of small bowel within the left

upper abdomen, likely reactive to adjacent ascites. The visualized loops of

small and large bowel otherwise demonstrate no evidence of obstruction or

inflammation. There is no intraperitoneal free air. No abnormally enlarged

lymph nodes are identified within the abdomen or pelvis. Tiny fat-containing

umbilical hernia and small bilateral fat-containing hernias noted.



There are degenerative changes of the lower lumbar spine. Osseous structures

otherwise demonstrate no evidence for acute fracture or destructive process.

Body wall edema noted.





IMPRESSION:



1. Moderate volume of abdominopelvic ascites.



2. Subtle micronodular contour noted of the liver which can be seen in the

setting of hepatic dysfunction. Reflux of contrast material also noted within

the hepatic veins which is nonspecific but can be seen in the setting of

right-sided heart dysfunction.



3. Prominent calcific abdominal aortic atherosclerosis and coronary artery

disease.



Signed by: Dr. Morro Benavidez MD on 8/28/2020 2:30 PM

## 2020-08-28 NOTE — XMS REPORT
Continuity of Care Document

                             Created on: 2020



JUSTIN MACK

External Reference #: [id number]

: 1950

Sex: Male



Demographics





                          Address                   [address] [place]  [address]

 

                          Home Phone                [phone]

 

                          Preferred Language        English

 

                          Marital Status            Unknown

 

                          Advent Affiliation     Unknown

 

                          Race                      Unknown

 

                                        Additional Race(s) 

 

 

                          Ethnic Group              Unknown





Author





                          Author                    [facility]

t

 

                          Organization              [facility]

 

                          Address                   [address]

[place]  [address]



 

                          Phone                     Unavailable







Support





                Name            Relationship    Address         Phone

 

                    JAYY WALKER PRS                 [address] [place], [place]  [id number] [phone]

 

                    AARON  JAYY PRS                 [address] [place], [place]  [address] [phone]

 

                    ARTEMIO MACK PRS                 [address] [place], [place]  [address] [phone]







Care Team Providers





                    Care Team Member Name Role                Phone

 

                    DANISHKATHY, S KRISTI   Attphys             Unavailable







Payers





           Payer Name Policy Type Policy Number Effective Date Expiration Date S

ource







Problems





           Condition Name Condition Details Condition Category Status     Onset 

Date Resolution

Date            Last Treatment Date Treating Clinician Comments        Source

 

                          Carotid artery disease                            Rojas

tid artery disease           

            Active                                                Problem       
                2016                                                
Mohamed O Jeroudi                     Problem   Active                        20

20 02:48:23            

                                        Lu Richardson

 

                          Hypercholesterolemia                              Hype

rcholesterolemia               

        Active                                                Problem           
            2016                                                Mohamed O 
Jeroudi                     Problem Active                  2016 02:48:23 

                Lu Richardson

 

                          Diabetes mellitus                                 Diab

etes mellitus                     

  Active                                                Problem                 
      2016                                                Mohamed O 
Jeroudi                     Problem Active                  2016 02:48:23 

                Lu Richardson

 

                          Equivalent angina                                 Equi

valent angina                     

  Active                                                Problem                 
      2016                                                Mohamed O 
Jeroudi                     Problem Active                  2016 02:48:23 

                Lu Richardson

 

                          CAD, Graft                                        CAD,

 Graft                        Active     

                                          Problem                        
2016                                                Mohamed O Jeroudi     
               Problem Active                  2016 02:48:23              

   Lu Richardson

 

                          Smoker                                            Smok

er                        Active             

                                  Diagnosis                        2016   
                                            Mohamed O Jeroudi                   
        Diagnosis Active                  2016 02:48:23                 Me

adria Richardson

 

                          DJD (degenerative joint disease) of knee              

           DJD 

(degenerative joint disease) of knee                        Active              
                                 Diagnosis                        2016    
                                           Mohamed O Meekoudi                    
        Diagnosis Active                  2016 02:48:23                 Me

adria Richardson

 

                          Subclavian artery stenosis, left                      

   Subclavian artery 

stenosis, left                        Active                                    
           Problem                        2016                            
                   Shaneka Hazel                     Problem      Active    

                             

2016 02:48:23                                         [facility]

 

                          CVA                                               CVA 

                       Active                   

                            Problem                        2016           
                                    Shaneka Hazel                     

Problem   Active                        2016 02:48:23                     

[facility]

 

                                        Atherosclerosis of native arteries of th

e extremities with intermittent 

claudication                                                    Atherosclerosis 

of native arteries of the 

extremities with intermittent claudication                        Active        
                                       Problem                        2016
                                               Shaneka Hazel                
        Problem Active                  2016 02:48:23                 Pete Richardson

 

                          Abnormal EKG                                      Abno

rmal EKG                        Active 

                                              Problem                        
2016                                                Shaneka Hazel     
               Problem Active                  2016 02:48:23              

   [facility]

 

                          Benign hypertensive heart disease                     

    Benign hypertensive 

heart disease                        Active                                     
          Problem                        2016                             
                  Shaneka Hazel                     Problem      Active     

                            

2016 02:48:23                                         [facility]

 

                          Long term current use of insulin                      

   Long term current use 

of insulin                        Active                                        
       Diagnosis                        2020                              
                 Shaneka Hazel                     Diagnosis    Active      

                           

2020 02:45:23                                         [facility]

 

                          Patient unable to exercise                         Pat

ient unable to exercise   

                    Active                                                
Diagnosis                        2020                                     
          Shaneka Hazel                     Diagnosis    Active             

                    2020 

02:45:23                                                    [facility]

 

                          Subclavian arterial stenosis                         S

ubclavian arterial 

stenosis                        Active                                          
     Diagnosis                        2020                                
               Shaneka Hazel                     Diagnosis    Active        

                         

2020 02:45:23                                         [facility]

 

                          Type 2 diabetes mellitus with unspecified complication

s                         

Type 2 diabetes mellitus with unspecified complications                        
Active                                                Diagnosis                 
      2020                                                Shaneka Hazel                     Diagnosis Active                  2020 02:45:2

3                 

[facility]

 

                          History of CEA (carotid endarterectomy)               

          History of CEA 

(carotid endarterectomy)                        Active                          
                     Diagnosis                        2020                
                               Shaneka Hazel                     Diagnosis  

               

Active                           2020 02:45:23                       Thanh Richardson

 

                          Osteoarthritis of knee, unspecified                   

      Osteoarthritis of 

knee, unspecified                        Active                                 
              Diagnosis                        2020                       
                        Shaneka Hazel                     Diagnosis         

  Active               

                          2020 02:45:23                           [facility]

 

                          Nicotine dependence, unspecified, uncomplicated       

                  Nicotine

dependence, unspecified, uncomplicated                        Active            
                                   Diagnosis                        2020  
                                             Shaneka Hazel                  
        Diagnosis Active                  2020 02:45:23                 [facility]

 

                          Pure hypercholesterolemia, unspecified                

         Pure 

hypercholesterolemia, unspecified                        Active                 
                              Diagnosis                        2020       
                                        Shaneka Hazel                     

Diagnosis Active                        2020 02:45:23                     

[facility]

 

                          Hypertensive heart disease without heart failure      

                   

Hypertensive heart disease without heart failure                        Active  
                                             Diagnosis                        
2020                                                Shaneka Hazel     
               Diagnosis Active                  2020 02:45:23            

     [facility]

 

                                        Atherosclerosis of coronary artery bypas

s graft of native heart with angina 

pectoris                                                        Atherosclerosis 

of coronary artery bypass graft

of native heart with angina pectoris                        Active              
                                 Problem                        2020      
                                         Shaneka Hazel                     

Problem   Active                        2020 02:45:23                     

[facility]

 

                          Abnormal electrocardiogram [ECG] [EKG]                

         Abnormal 

electrocardiogram [ECG] [EKG]                        Active                     
                           Diagnosis                        2020          
                                      Shaneka Hazel                     

Diagnosis Active                        2020 02:45:23                     

[facility]

 

                          Occlusion and stenosis of bilateral carotid arteries  

                       

Occlusion and stenosis of bilateral carotid arteries                        
Active                                                Diagnosis                 
       2020                                                Shaneka Hazel                     Diagnosis Active                  2020 02:45:2

3                 

[facility]

 

                          Acute mitral insufficiency                         Acu

te mitral insufficiency   

                     Active                                                
Problem                        2020                                       
         Shaneka Hazel                     Problem      Active              

                   2020 

02:45:23                                                    [facility]

 

                          History of MI (myocardial infarction)                 

        History of MI 

(myocardial infarction)                        Active                           
                     Problem                        2020                  
                              Shaneka Hazel                     Problem     

              

Active                           2020 02:45:23                       [facility]

 

                          Mitral valve disorders                            Mitr

al valve disorders           

             Active                                                Problem      
                  2020                                                
Shaneka Hazel                     Problem   Active                        07 02:45:23            

                                        [facility]

 

                                        Atherosclerosis of native artery of both

 lower extremities with intermittent 

claudication                                                    Atherosclerosis 

of native artery of both 

lower extremities with intermittent claudication                        Active  
                                              Diagnosis                        
2020                                                Shaneka Hazel     
                Diagnosis Active                  2020 02:45:23           

      [facility]

 

                          THOMPSON (dyspnea on exertion)                         THOMPSON 

(dyspnea on exertion)     

                   Active                                                Problem
                        2020                                              
  Shaneka Hazel                     Problem    Active                       

    2020 02:45:23  

                                                    [facility]

 

                          Chronic systolic congestive heart failure             

            Chronic 

systolic congestive heart failure                        Active                 
                               Problem                        2020        
                                        Shaneka Hazel                     

Problem   Active                        2020 02:45:23                     

[facility]

 

                          Abnormal cardiovascular stress test                   

      Abnormal 

cardiovascular stress test                        Active                        
                        Problem                        2020               
                                 Mohamed O Jeroudi                     Problem  

                 

Active                           2020 02:45:23                       [facility]

 

                          Other forms of angina pectoris                        

 Other forms of angina 

pectoris                        Active                                          
      Problem                        2020                                 
               Shaneka CHRISTENSEN Jeroudi                     Problem      Active        

                         

2020 02:45:23                                         [facility]







Allergies, Adverse Reactions, Alerts





        Allergy Name Allergy Type Status  Severity Reaction(s) Onset Date Inacti

ve Date 

Treating Clinician        Comments                  Source

 

       No Known Allergies DA     Active U             2020 00:00:00       

               [facility]

 

       N.KFLAVIO MEDINA Active        Info Not Available 2019 00:00:00   

                   [facility]

 

       No Known Allergies DA     Active U             2015 00:00:00       

               [facility]







Family History





           Family Member Diagnosis  Comments   Start Date Stop Date  Source

 

           Unknown Family Member Family History            2016 02:48:12 2

 02:48:12 

[facility]







Social History





           Social Habit Start Date Stop Date  Quantity   Comments   Source

 

           Smoking    2015 00:00:00 2015 00:00:00                   

    [facility]







Medications





             Ordered Medication Name Filled Medication Name Start Date   Stop Da

te    Current 

Medication? Ordering Clinician Indication Dosage     Frequency  Signature (SIG) 

Comments                  Components                Source

 

      Clopidogrel Bisulfate       2020 02:45:23       Yes   Ahmad Jeroudi 

                  1 tablet  

                                                    [facility]

 

     Aspirin EC      2020 02:45:23      Yes  Ahmad Jeroudi                

1 tablet           [facility]

 

       Glimepiride        2020 02:45:23        Yes    Ahmad Jeroudi       

               1 tablet with 

breakfast or the first main meal of the day                                     

    [facility]

 

       Metoprolol Tartrate        2020 02:45:23        Yes    Ahmad Jeroud

i                      1/2 half 

tablet                                                      [facility]

 

     Levemir      2020 02:45:23      Yes  Ahmad Jeroudi                as 

directed           [facility]

 

      Atorvastatin Calcium       2020 02:45:23       Yes   Ahmad Jeroudi  

                 1 tablet       

                                        [facility]

 

     Lisinopril      2020 02:45:23      Yes  Ahmad Jeroudi                

1 tablet           [facility]

 

     Aspir-81      2020 02:45:23      Yes  Ahmad Jeroudi                1 

tablet           [facility]

 

     Lasix      2020 00:00:00      Yes  Ahmad Jeroudi                1 tab

let           [facility]

 

     Levemir      2016 02:48:23      Yes  Shaneka Edentadi                a

s directed           

[facility] [place]

 

       Glimepiride        2016 02:48:23        Yes    Shaneka Edenoudi     

                 1 tablet with 

breakfast or the first main meal of the day                                     

    [facility]

 

       Metoprolol Tartrate        2016 02:48:23        Yes    Shaneka Xiong

charlotte                      1/2 half 

tablet                                                      [facility]

 

       Clopidogrel Bisulfate        2016 02:48:23        Yes    Shaneka Forrester

roudi                      1 tablet

                                                            [facility]

 

     Aspirin EC      2016 02:48:23      Yes  Shaneka Edenoudi              

  1 tablet           

[facility]

 

      Atorvastatin Calcium       2016 02:48:23       Yes   Shaneka Edenoudi

                   1 tablet 

                                                    [facility] [place]

 

     Lisinopril      2016 02:48:23      Yes  Shaneka Edenoudi              

  1 tablet           

[facility]







Vital Signs





             Vital Name   Observation Time Observation Value Comments     Source

 

             Weight       2020 18:00:00                           [facility]

 [place]

 

             [facility]       2020 18:00:00                           [facility]

 

             Temperature Oral (F) 2020 18:00:00 97.0 F                    

[facility]

 

             Heart Rate   2020 18:00:00                           [facility]

 [place]

 

             Diastolic (mm Hg) 2020 18:00:00                           [facility] [place]

 

             Systolic (mm Hg) 2020 18:00:00                           [facility]

 

             Weight       2019 16:00:00                           [facility]

 [place]

 

             Height       2019 16:00:00                           [facility]

 [place]

 

             Temperature Oral (F) 2019 16:00:00 97.0 F                    

[facility] [place]

 

             Heart Rate   2019 16:00:00                           [facility]

 [place]

 

             Diastolic (mm Hg) 2019 16:00:00                           [facility] [place]

 

             Systolic (mm Hg) 2019 16:00:00                           [facility]

[facility]ial [place]

 

             Weight       2019 18:00:00                           [facility]

 [place]

 

             Height       2019 18:00:00                           [facility]

 [place]

 

             Temperature Oral (F) 2019 18:00:00 96.7 F                    

[facility] [place]

 

             Heart Rate   2019 18:00:00                           [facility]

 [place]

 

             Diastolic (mm Hg) 2019 18:00:00                           [facility] [place]

 

             Systolic (mm Hg) 2019 18:00:00                           [facility]

[facility] [place]

 

             Weight       2019 19:00:00                           [facility]

 [place]

 

             Height       2019 19:00:00                           [facility]

 [place]

 

             Temperature Oral (F) 2019 19:00:00 96.2 F                    

[facility]

 

             Heart Rate   2019 19:00:00                           [facility]

 [place]

 

             Diastolic (mm Hg) 2019 19:00:00                           [facility] [place]

 

             Systolic (mm Hg) 2019 19:00:00                           [facility]

[facility] [place]

 

             Weight       2019 20:30:00                           [facility]

 [place]

 

             Height       2019 20:30:00                           [facility]

 [place]

 

             Temperature Oral (F) 2019 20:30:00 96.1 F                    

[facility] [place]

 

             Heart Rate   2019 20:30:00                           [facility]

 [place]

 

             Diastolic (mm Hg) 2019 20:30:00                           [facility]

[facility] [place]

 

             Systolic (mm Hg) 2019 20:30:00                           [facility]

[facility] [place]

 

             Weight       2015 20:00:00                           [facility]

 [place]

 

             Height       2015 20:00:00                           [facility]

 [place]

 

             Temperature Oral (F) 2015 20:00:00 97.9 F                    

[facility] [place]

 

             Heart Rate   2015 20:00:00                           [facility]

 [place]

 

             Diastolic (mm Hg) 2015 20:00:00                           [facility]

[facility] [place]

 

             Systolic (mm Hg) 2015 20:00:00                           [facility]

[facility] [place]

 

             Weight       2015 19:00:00                           [facility]

 [place]

 

             Height       2015 19:00:00                           [facility]

 

             Temperature Oral (F) 2015 19:00:00 97.2 F                    

[facility] [place]

 

             Heart Rate   2015 19:00:00                           [facility]

 [facility]

 

             Diastolic (mm Hg) 2015 19:00:00                           [facility]

[facility]

 

             Systolic (mm Hg) 2015 19:00:00                           [facility]

[facility] [place]

 

             Weight       2015 19:30:00                           [facility]

 [place]

 

             Height       2015 19:30:00                           [facility]

 [place]

 

             Temperature Oral (F) 2015 19:30:00 96.7 F                    

[facility]

 

             Heart Rate   2015 19:30:00                           [facility]

 [place]

 

             Diastolic (mm Hg) 2015 19:30:00                           [facility]

[facility] [place]

 

             Systolic (mm Hg) 2015 19:30:00                           [facility] [place]







Procedures

This patient has no known procedures.



Encounters





             Start Date/Time End Date/Time Encounter Type Admission Type [facility]   Care Department Encounter ID    Source

 

          2020 13:00:00 2020 13:00:00 Outpatient                    

 Shaneka Hazel MD PA   [id number]                    [facility]

 

          2019 11:00:00 2019 11:00:00 Outpatient                    

 Shaneka Hazel MD PA   [id number]                    [facility]

 

          2019 13:00:00 2019 13:00:00 Outpatient                    

 Shaneka Hazel MD PA   [id number]                    [facility]

 

          2019 14:00:00 2019 14:00:00 Outpatient                    

 Shaneka Hazel MD PA   [id number]                    [facility]

 

          2019 15:30:00 2019 15:30:00 Outpatient                    

 Shaneka Hazel MD PA   [id number]                    [facility]

 

           2015 15:00:00 2015 15:00:00 Outpatient                   

    [place] [id number]               [facility]

 

           2015 14:00:00 2015 14:00:00 Outpatient                   

    [place] [id number]               [facility]

 

           2015 14:30:00 2015 14:30:00 Outpatient                   

    [place] [id number]               [facility]







Results





           Test Description Test Time  Test Comments Results    Result Comments 

Source

 

                CT ABDOMEN/PELVIS W 2020 14:13:00                         

                              

                                                [place] [address]      Patient Name: JUSTIN MACK                                
MR #: [id number]                  : 1950                               
    Age/Sex: 69/M  [id number]                              Req #: [id number]
[id number]  [facility] Physician:                                                      
Ordered by: KRISTI JAIMES DO                         Report #: [id number]    
    Location: ER                                      Room/Bed:                 
  
________________________________________________________________________________

___________________    Procedure: [id number] CT/CT ABDOMEN/PELVIS W  Exam Date: 
20                            Exam Time: 1330                             
                 REPORT STATUS: Signed    CT of the abdomen and pelvis, with 
contrast.          History: Right upper quadrant abdominal pain.      
Comparison: None available.      Technique: Multidetector CT scanning of the 
abdomen and pelvis was performed   from the level of the lung bases to the 
inferior pubic rami after intravenous   administration of contrast.  Coronal and
 sagittal multiplanar reformations were   obtained.      RADIATION DOSE:        
Total DLP: 751.24 mGy*cm        Dose modulation, iterative reconstruction, 
and/or weight based adjustment   of the mA/kV was utilized to reduce the 
radiation dose to as low as reasonably   achievable.       FINDINGS:   Areas of 
scattered subsegmental atelectasis noted within the visualized lung   bases. 
Atherosclerotic calcifications noted within the visualized coronary   arteries. 
     There is a moderate volume of simple-appearing abdominopelvic ascites 
present.      The liver is normal in size and attenuation but demonstrates a 
subtle   micronodular contour which can be seen in setting of hepatic 
dysfunction. No   focal hepatic abnormality is identified on the single phase 
examination. There   is reflux of contrast material into the hepatic veins which
 is nonspecific but   can be seen in the setting of right-sided heart 
dysfunction. The gallbladder is   not dilated. There is no evidence for 
radiopaque stone or wall thickening.   There is no intra or extrahepatic biliary
 ductal dilatation. The stomach,   spleen, pancreas, and bilateral adrenal 
glands are unremarkable.      The kidneys are normal in size and location and 
enhance symmetrically. Vascular   calcifications are noted on the left. There is
 no evidence for nephrolithiasis   or hydronephrosis. No ureteral stone or 
dilatation is appreciated.       The abdominal aorta is normal in course and 
caliber with extensive   atherosclerotic calcifications. Significant 
atherosclerotic plaquing noted at   the origins of the celiac axis, SMA, left 
renal artery, and MERCEDES. The IVC is   unremarkable. The portal venous system, SMV,
 and splenic vein appear patent.      Please note evaluation of bowel is limited
 without the use of enteric contrast   material. Mild wall thickening noted of a
 loop of small bowel within the left   upper abdomen, likely reactive to 
adjacent ascites. The visualized loops of   small and large bowel otherwise 
demonstrate no evidence of obstruction or   inflammation. There is no 
intraperitoneal free air. No abnormally enlarged   lymph nodes are identified 
within the abdomen or pelvis. Tiny fat-containing   umbilical hernia and small 
bilateral fat-containing hernias noted.      There are degenerative changes of 
the lower lumbar spine. Osseous structures   otherwise demonstrate no evidence 
for acute fracture or destructive process.   Body wall edema noted.         
IMPRESSION:      1. Moderate volume of abdominopelvic ascites.      2. Subtle 
micronodular contour noted of the liver which can be seen in the   setting of 
hepatic dysfunction. Reflux of contrast material also noted within   the hepatic
 veins which is nonspecific but can be seen in the setting of   right-sided 
heart dysfunction.      3. Prominent calcific abdominal aortic atherosclerosis 
and coronary artery   disease.      Signed by: Dr. Morro Benavidez MD on 2020
 2:30 PM        Dictated By: MORRO BENAVIDEZ MD  Electronically Signed By: MORRO BENAVIDEZ MD on 20 1430  Transcribed By: PEG on 20 1430       COPY
 TO:   KRISTI JAIMES DO                                     

 

                    BASIC METABOLIC PANEL 2020 08:17:00   

 

                                        Test Item

 

             SODIUM (test code = NA) 138 mmol/L   136-145      N             

 

             POTASSIUM (test code = K) 3.8 mmol/L   3.5-5.1      N             

 

             CHLORIDE (test code = CL) 92.0 mmol/L         L             

 

             CARBON DIOXIDE (test code = CO2) 39.0 mmol/L  21-32        H       

      

 

             ANION GAP (test code = GAP) 10.8         10-20        N            

 

 

             GLUCOSE (test code = GLU) 63 mg/dL            L             

 

             BLOOD UREA NITROGEN (test code = BUN) 29 mg/dL     7-18         H  

           

 

             GLOMERULAR FILTRATION RATE (test code = GFR) 46 mL/min    >=60     

                 Estimated GFR by 

using Modified MDRD formula.Chronic kidney disease is defined as either kidney 
damageor GFR <60 mL/min/1.73 m2 for >3 months.

 

             CREATININE (test code = CREAT) 1.50 mg/dL   0.7-1.3      H         

    

 

             BUN/CREATININE RATIO (test code = BUN/CREA) 19.2         10-20     

   N             

 

             CALCIUM (test code = CA) 9.7 mg/dL    8.5-10.1     N             





BASIC METABOLIC [id number] 08:09:00* 



             Test Item    Value        Reference Range Interpretation Comments

 

             SODIUM (test code = NA) 138 mmol/L   136-145      N             

 

             POTASSIUM (test code = K) 3.8 mmol/L   3.5-5.1      N             

 

             CHLORIDE (test code = CL) 92.0 mmol/L         L             

 

             CARBON DIOXIDE (test code = CO2)  mmol/L      21-32                

      

 

             ANION GAP (test code = GAP) 10.8         10-20        N            

 

 

             GLUCOSE (test code = GLU)  mg/dL                            

 

             BLOOD UREA NITROGEN (test code = BUN)  mg/dL       7-18            

           

 

             GLOMERULAR FILTRATION RATE (test code = GFR)  mL/min      >=60     

                  

 

             CREATININE (test code = CREAT)  mg/dL       0.7-1.3                

    

 

             BUN/CREATININE RATIO (test code = BUN/CREA)              10-20     

                 

 

             CALCIUM (test code = CA) 9.7 mg/dL    8.5-10.1     N             





[id number] 07:58:00* 



             Test Item    Value        Reference Range Interpretation Comments

 

             GLUBED (test code = GLUBED) 121 mg/dL           H            

Performed by certified  

at [facility]





[id number] 06:31:00* 



             Test Item    Value        Reference Range Interpretation Comments

 

             GLUBED (test code = GLUBED) 65 mg/dL            L            

Performed by certified  at

 [facility]





COMPREHENSIVE METABOLIC [id number] 05:10:00* 



             Test Item    Value        Reference Range Interpretation Comments

 

             SODIUM (test code = NA) 137 mmol/L   136-145      N             

 

             POTASSIUM (test code = K) 3.6 mmol/L   3.5-5.1      N             

 

             CHLORIDE (test code = CL) 92.0 mmol/L         L             

 

             CARBON DIOXIDE (test code = CO2) 39.0 mmol/L  21-32        H       

      

 

             ANION GAP (test code = GAP) 9.6          10-20        L            

 

 

             GLUCOSE (test code = GLU) 63 mg/dL            L             

 

             BLOOD UREA NITROGEN (test code = BUN) 29 mg/dL     7-18         H  

           

 

             GLOMERULAR FILTRATION RATE (test code = GFR) 46 mL/min    >=60     

                 Estimated GFR by 

using Modified MDRD formula.Chronic kidney disease is defined as either kidney 
damageor GFR <60 mL/min/1.73 m2 for >3 months.

 

             CREATININE (test code = CREAT) 1.50 mg/dL   0.7-1.3      H         

    

 

             BUN/CREATININE RATIO (test code = BUN/CREA) 19.6         10-20     

   N             

 

             TOTAL PROTEIN (test code = PROT) 7.2 gram/dL  6.4-8.2      N       

      

 

             ALBUMIN (test code = ALB) 3.0 g/dL     3.4-5.0      L             

 

             GLOBULIN (test code = GLOB) 4.2 gram/dL  2.7-4.2      N            

 

 

             ALBUMIN/GLOBULIN RATIO (test code = A/G) 0.7          0.75-1.50    

L             

 

             CALCIUM (test code = CA) 9.6 mg/dL    8.5-10.1     N             

 

             BILIRUBIN TOTAL (test code = BILT) 1.10 mg/dL   0.0-1.0      H     

        

 

             SGOT/AST (test code = AST) 23 IUnit/L   15-37        N             

 

             SGPT/ALT (test code = ALT) 26 IUnit/L   12-78        N             

 

             ALKALINE PHOSPHATASE TOTAL (test code = ALKP) 73 IUnit/L     

     N            **Note change 

in reference range due to change in reagent.**





COMPREHENSIVE METABOLIC [id number] 05:01:00* 



             Test Item    Value        Reference Range Interpretation Comments

 

             SODIUM (test code = NA) 137 mmol/L   136-145      N             

 

             POTASSIUM (test code = K) 3.6 mmol/L   3.5-5.1      N             

 

             CHLORIDE (test code = CL) 92.0 mmol/L         L             

 

             CARBON DIOXIDE (test code = CO2)  mmol/L      21-32                

      

 

             ANION GAP (test code = GAP)              10-20                     

 

 

             GLUCOSE (test code = GLU)  mg/dL                            

 

             BLOOD UREA NITROGEN (test code = BUN)  mg/dL       7-18            

           

 

             GLOMERULAR FILTRATION RATE (test code = GFR)  mL/min      >=60     

                  

 

             CREATININE (test code = CREAT)  mg/dL       0.7-1.3                

    

 

             BUN/CREATININE RATIO (test code = BUN/CREA)              10-20     

                 

 

             TOTAL PROTEIN (test code = PROT)  gram/dL     6.4-8.2              

      

 

             ALBUMIN (test code = ALB)  g/dL        3.4-5.0                    

 

             GLOBULIN (test code = GLOB)  gram/dL     2.7-4.2                   

 

 

             ALBUMIN/GLOBULIN RATIO (test code = A/G)              0.75-1.50    

              

 

             CALCIUM (test code = CA)  mg/dL       8.5-10.1                   

 

             BILIRUBIN TOTAL (test code = BILT)  mg/dL       0.0-1.0            

        

 

             SGOT/AST (test code = AST)  IUnit/L     15-37                      

 

             SGPT/ALT (test code = ALT)  IUnit/L     12-78                      

 

             ALKALINE PHOSPHATASE TOTAL (test code = ALKP)  IUnit/L       

                   





CBC W/AUTO [id number] 04:40:00* 



             Test Item    Value        Reference Range Interpretation Comments

 

             WHITE BLOOD CELL (test code = WBC) 7.2 K/mm3    4.5-12.5     N     

        

 

             RED BLOOD CELL (test code = RBC) 4.80 mill/mm3 4.0-5.8      N      

       

 

             HEMOGLOBIN (test code = HGB) 12.8 gram/dL 13.0-17.5    L           

  

 

             HEMATOCRIT (test code = HCT) 39.7 %       42.0-52.0    L           

  

 

             MEAN CELL VOLUME (test code = MCV) 82.7 fL      80-98        N     

        

 

             MEAN CELL HGB (test code = MCH) 26.7 picogram 27.0-33.0    L       

      

 

             MEAN CELL HGB CONCETRATION (test code = MCHC) 32.2 gram/dL 33.0-36.

0    L             

 

             RED CELL DISTRIBUTION WIDTH (test code = RDW) 15.7 %       11.6-16.

2    N             

 

             RED CELL DISTRIBUTION WIDTH SD (test code = RDW-SD) 47.4 fL      37

.0-51.0    N             

 

             PLATELET COUNT (test code = PLT) 284 K/mm3    150-450      N       

      

 

             MEAN PLATELET VOLUME (test code = MPV) 10.7 fL      6.7-11.0     N 

            

 

             NEUTROPHIL % (test code = NT%) 64.6 %       39.0-69.0    N         

    

 

             IMMATURE GRANULOCYTE % (test code = IG%) 0.1 %        0.0-5.0      

N             

 

             LYMPHOCYTE % (test code = LY%) 14.6 %       25.0-55.0    L         

    

 

             MONOCYTE % (test code = MO%) 11.3 %       0.0-10.0     H           

  

 

             EOSINOPHIL % (test code = EO%) 7.9 %        0.0-5.0      H         

    

 

             BASOPHIL % (test code = BA%) 1.5 %        0.0-1.0      H           

  

 

             NUCLEATED RBC % (test code = NRBC%) 0.0 %        0-0          N    

         

 

             NEUTROPHIL # (test code = NT#) 4.65 K/mm3   1.8-7.7      N         

    

 

             IMMATURE GRANULOCYTE # (test code = IG#) 0.01 x10 3/uL 0-0.03      

 N             

 

             LYMPHOCYTE # (test code = LY#) 1.05 K/mm3   1.0-5.0      N         

    

 

             MONOCYTE # (test code = MO#) 0.81 K/mm3   0-0.8        H           

  

 

             EOSINOPHIL # (test code = EO#) 0.57 K/mm3   0.0-0.5      H         

    

 

             BASOPHIL # (test code = BA#) 0.11 K/mm3   0.0-0.2      N           

  

 

             NUCLEATED RBC # (test code = NRBC#) 0.00 K/mm3   0.0-0.1      N    

         





[id number] 20:15:00* 



             Test Item    Value        Reference Range Interpretation Comments

 

             GLUBED (test code = GLUBED) 159 mg/dL           H            

Performed by certified  

at [facility]





[id number] 17:32:00* 



             Test Item    Value        Reference Range Interpretation Comments

 

             GLUBED (test code = GLUBED) 120 mg/dL           H            

Performed by certified  

at [facility]





[id number] 12:43:00* 



             Test Item    Value        Reference Range Interpretation Comments

 

             GLUBED (test code = GLUBED) 171 mg/dL           H            

Performed by certified  

at [facility]





[id number] 08:29:00* 



             Test Item    Value        Reference Range Interpretation Comments

 

             GLUBED (test code = GLUBED) 85 mg/dL            N            

Performed by certified  at

 [facility]





COMPREHENSIVE METABOLIC [id number] 05:57:00* 



             Test Item    Value        Reference Range Interpretation Comments

 

             SODIUM (test code = NA) 137 mmol/L   136-145      N             

 

             POTASSIUM (test code = K) 4.0 mmol/L   3.5-5.1      N             

 

             CHLORIDE (test code = CL) 89.0 mmol/L         L             

 

             CARBON DIOXIDE (test code = CO2) 42.0 mmol/L  21-32        H       

      

 

             ANION GAP (test code = GAP) 10.0         10-20        N            

 

 

             GLUCOSE (test code = GLU) 76 mg/dL            N             

 

             BLOOD UREA NITROGEN (test code = BUN) 27 mg/dL     7-18         H  

          RESULT VERIFIED BY REPEAT 

ANALYSIS

 

             GLOMERULAR FILTRATION RATE (test code = GFR) 40 mL/min    >=60     

                 Estimated GFR by 

using Modified MDRD formula.Chronic kidney disease is defined as either kidney 
damageor GFR <60 mL/min/1.73 m2 for >3 months.

 

             CREATININE (test code = CREAT) 1.70 mg/dL   0.7-1.3      H         

    

 

             BUN/CREATININE RATIO (test code = BUN/CREA) 15.9         10-20     

   N             

 

             TOTAL PROTEIN (test code = PROT) 8.1 gram/dL  6.4-8.2      N       

      

 

             ALBUMIN (test code = ALB) 3.3 g/dL     3.4-5.0      L             

 

             GLOBULIN (test code = GLOB) 4.8 gram/dL  2.7-4.2      H            

 

 

             ALBUMIN/GLOBULIN RATIO (test code = A/G) 0.7          0.75-1.50    

L             

 

             CALCIUM (test code = CA) 10.2 mg/dL   8.5-10.1     H             

 

             BILIRUBIN TOTAL (test code = BILT) 1.20 mg/dL   0.0-1.0      H     

        

 

             SGOT/AST (test code = AST) 22 IUnit/L   15-37        N             

 

             SGPT/ALT (test code = ALT) 29 IUnit/L   12-78        N             

 

             ALKALINE PHOSPHATASE TOTAL (test code = ALKP) 83 IUnit/L     

     N            **Note change 

in reference range due to change in reagent.**





COMPREHENSIVE METABOLIC [id number] 04:43:00* 



             Test Item    Value        Reference Range Interpretation Comments

 

             SODIUM (test code = NA) 137 mmol/L   136-145      N             

 

             POTASSIUM (test code = K) 4.0 mmol/L   3.5-5.1      N             

 

             CHLORIDE (test code = CL) 89.0 mmol/L         L             

 

             CARBON DIOXIDE (test code = CO2)  mmol/L      21-32                

      

 

             ANION GAP (test code = GAP)              10-20                     

 

 

             GLUCOSE (test code = GLU)  mg/dL                            

 

             BLOOD UREA NITROGEN (test code = BUN)  mg/dL       7-18            

           

 

             GLOMERULAR FILTRATION RATE (test code = GFR)  mL/min      >=60     

                  

 

             CREATININE (test code = CREAT)  mg/dL       0.7-1.3                

    

 

             BUN/CREATININE RATIO (test code = BUN/CREA)              10-20     

                 

 

             TOTAL PROTEIN (test code = PROT)  gram/dL     6.4-8.2              

      

 

             ALBUMIN (test code = ALB)  g/dL        3.4-5.0                    

 

             GLOBULIN (test code = GLOB)  gram/dL     2.7-4.2                   

 

 

             ALBUMIN/GLOBULIN RATIO (test code = A/G)              0.75-1.50    

              

 

             CALCIUM (test code = CA)  mg/dL       8.5-10.1                   

 

             BILIRUBIN TOTAL (test code = BILT)  mg/dL       0.0-1.0            

        

 

             SGOT/AST (test code = AST)  IUnit/L     15-37                      

 

             SGPT/ALT (test code = ALT)  IUnit/L     12-78                      

 

             ALKALINE PHOSPHATASE TOTAL (test code = ALKP)  IUnit/L       

                   





[id number] 19:59:00* 



             Test Item    Value        Reference Range Interpretation Comments

 

             GLUBED (test code = GLUBED) 162 mg/dL           H            

Performed by certified  

at [facility]





[id number] 17:02:00* 



             Test Item    Value        Reference Range Interpretation Comments

 

             GLUBED (test code = GLUBED) 168 mg/dL           H            

Performed by certified  

at [facility]





[id number] 12:11:00* 



             Test Item    Value        Reference Range Interpretation Comments

 

             GLUBED (test code = GLUBED) 120 mg/dL           H            

Performed by certified  

at [facility]





[id number] 07:43:00* 



             Test Item    Value        Reference Range Interpretation Comments

 

             GLUBED (test code = GLUBED) 85 mg/dL            N            

Performed by certified  at

 [facility]





COMPREHENSIVE METABOLIC [id number] 05:25:00* 



             Test Item    Value        Reference Range Interpretation Comments

 

             SODIUM (test code = NA) 138 mmol/L   136-145      N             

 

             POTASSIUM (test code = K) 3.4 mmol/L   3.5-5.1      L             

 

             CHLORIDE (test code = CL) 92.0 mmol/L         L             

 

             CARBON DIOXIDE (test code = CO2) 38.0 mmol/L  21-32        H       

      

 

             ANION GAP (test code = GAP) 11.4         10-20        N            

 

 

             GLUCOSE (test code = GLU) 81 mg/dL            N             

 

             BLOOD UREA NITROGEN (test code = BUN) 20 mg/dL     7-18         H  

           

 

             GLOMERULAR FILTRATION RATE (test code = GFR) 60 mL/min    >=60     

                 Estimated GFR by 

using Modified MDRD formula.Chronic kidney disease is defined as either kidney 
damageor GFR <60 mL/min/1.73 m2 for >3 months.

 

             CREATININE (test code = CREAT) 1.20 mg/dL   0.7-1.3      N         

    

 

             BUN/CREATININE RATIO (test code = BUN/CREA) 16.0         10-20     

   N             

 

             TOTAL PROTEIN (test code = PROT) 6.8 gram/dL  6.4-8.2      N       

      

 

             ALBUMIN (test code = ALB) 2.7 g/dL     3.4-5.0      L             

 

             GLOBULIN (test code = GLOB) 4.1 gram/dL  2.7-4.2      N            

 

 

             ALBUMIN/GLOBULIN RATIO (test code = A/G) 0.7          0.75-1.50    

L             

 

             CALCIUM (test code = CA) 9.7 mg/dL    8.5-10.1     N             

 

             BILIRUBIN TOTAL (test code = BILT) 1.10 mg/dL   0.0-1.0      H     

        

 

             SGOT/AST (test code = AST) 21 IUnit/L   15-37        N             

 

             SGPT/ALT (test code = ALT) 23 IUnit/L   12-78        N             

 

             ALKALINE PHOSPHATASE TOTAL (test code = ALKP) 70 IUnit/L     

     N            **Note change 

in reference range due to change in reagent.**





COMPREHENSIVE METABOLIC [id number] 05:09:00* 



             Test Item    Value        Reference Range Interpretation Comments

 

             SODIUM (test code = NA) 138 mmol/L   136-145      N             

 

             POTASSIUM (test code = K) 3.4 mmol/L   3.5-5.1      L             

 

             CHLORIDE (test code = CL) 92.0 mmol/L         L             

 

             CARBON DIOXIDE (test code = CO2)  mmol/L      21-32                

      

 

             ANION GAP (test code = GAP)              10-20                     

 

 

             GLUCOSE (test code = GLU)  mg/dL                            

 

             BLOOD UREA NITROGEN (test code = BUN)  mg/dL       7-18            

           

 

             GLOMERULAR FILTRATION RATE (test code = GFR)  mL/min      >=60     

                  

 

             CREATININE (test code = CREAT)  mg/dL       0.7-1.3                

    

 

             BUN/CREATININE RATIO (test code = BUN/CREA)              10-20     

                 

 

             TOTAL PROTEIN (test code = PROT)  gram/dL     6.4-8.2              

      

 

             ALBUMIN (test code = ALB)  g/dL        3.4-5.0                    

 

             GLOBULIN (test code = GLOB)  gram/dL     2.7-4.2                   

 

 

             ALBUMIN/GLOBULIN RATIO (test code = A/G)              0.75-1.50    

              

 

             CALCIUM (test code = CA)  mg/dL       8.5-10.1                   

 

             BILIRUBIN TOTAL (test code = BILT)  mg/dL       0.0-1.0            

        

 

             SGOT/AST (test code = AST)  IUnit/L     15-37                      

 

             SGPT/ALT (test code = ALT)  IUnit/L     12-78                      

 

             ALKALINE PHOSPHATASE TOTAL (test code = ALKP)  IUnit/L       

                   





[id number] 19:59:00* 



             Test Item    Value        Reference Range Interpretation Comments

 

             GLUBED (test code = GLUBED) 209 mg/dL           H            

Performed by certified  

at [facility]





[id number] 16:00:00* 



             Test Item    Value        Reference Range Interpretation Comments

 

             GLUBED (test code = GLUBED) 150 mg/dL           H            

Performed by certified  

at [facility]





[id number] 11:53:00* 



             Test Item    Value        Reference Range Interpretation Comments

 

             GLUBED (test code = GLUBED) 115 mg/dL           H            

Performed by certified  

at [facility]





[id number] 07:47:00* 



             Test Item    Value        Reference Range Interpretation Comments

 

             GLUBED (test code = GLUBED) 77 mg/dL            N            

Performed by certified  at

 [facility]





COMPREHENSIVE METABOLIC [id number] 07:05:00* 



             Test Item    Value        Reference Range Interpretation Comments

 

             SODIUM (test code = NA) 138 mmol/L   136-145      N             

 

             POTASSIUM (test code = K) 3.6 mmol/L   3.5-5.1      N             

 

             CHLORIDE (test code = CL) 97.0 mmol/L         L             

 

             CARBON DIOXIDE (test code = CO2) 34.0 mmol/L  21-32        H       

      

 

             ANION GAP (test code = GAP) 10.6         10-20        N            

 

 

             GLUCOSE (test code = GLU) 66 mg/dL            L             

 

             BLOOD UREA NITROGEN (test code = BUN) 18 mg/dL     7-18         N  

           

 

             GLOMERULAR FILTRATION RATE (test code = GFR) > 60 mL/min  >=60     

                 Estimated GFR by

 using Modified MDRD formula.Chronic kidney disease is defined as either kidney 
damageor GFR <60 mL/min/1.73 m2 for >3 months.

 

             CREATININE (test code = CREAT) 1.10 mg/dL   0.7-1.3      N         

    

 

             BUN/CREATININE RATIO (test code = BUN/CREA) 16.1         10-20     

   N             

 

             TOTAL PROTEIN (test code = PROT) 6.2 gram/dL  6.4-8.2      L       

      

 

             ALBUMIN (test code = ALB) 2.6 g/dL     3.4-5.0      L             

 

             GLOBULIN (test code = GLOB) 3.6 gram/dL  2.7-4.2      N            

 

 

             ALBUMIN/GLOBULIN RATIO (test code = A/G) 0.7          0.75-1.50    

L             

 

             CALCIUM (test code = CA) 9.4 mg/dL    8.5-10.1     N             

 

             BILIRUBIN TOTAL (test code = BILT) 1.10 mg/dL   0.0-1.0      H     

        

 

             SGOT/AST (test code = AST) 16 IUnit/L   15-37        N             

 

             SGPT/ALT (test code = ALT) 21 IUnit/L   12-78        N             

 

             ALKALINE PHOSPHATASE TOTAL (test code = ALKP) 70 IUnit/L     

     N            **Note change 

in reference range due to change in reagent.**





COMPREHENSIVE METABOLIC [id number] 07:00:00* 



             Test Item    Value        Reference Range Interpretation Comments

 

             SODIUM (test code = NA) 138 mmol/L   136-145      N             

 

             POTASSIUM (test code = K) 3.6 mmol/L   3.5-5.1      N             

 

             CHLORIDE (test code = CL) 97.0 mmol/L         L             

 

             CARBON DIOXIDE (test code = CO2)  mmol/L      21-32                

      

 

             ANION GAP (test code = GAP)              10-20                     

 

 

             GLUCOSE (test code = GLU)  mg/dL                            

 

             BLOOD UREA NITROGEN (test code = BUN)  mg/dL       7-18            

           

 

             GLOMERULAR FILTRATION RATE (test code = GFR)  mL/min      >=60     

                  

 

             CREATININE (test code = CREAT)  mg/dL       0.7-1.3                

    

 

             BUN/CREATININE RATIO (test code = BUN/CREA)              10-20     

                 

 

             TOTAL PROTEIN (test code = PROT)  gram/dL     6.4-8.2              

      

 

             ALBUMIN (test code = ALB)  g/dL        3.4-5.0                    

 

             GLOBULIN (test code = GLOB)  gram/dL     2.7-4.2                   

 

 

             ALBUMIN/GLOBULIN RATIO (test code = A/G)              0.75-1.50    

              

 

             CALCIUM (test code = CA)  mg/dL       8.5-10.1                   

 

             BILIRUBIN TOTAL (test code = BILT)  mg/dL       0.0-1.0            

        

 

             SGOT/AST (test code = AST)  IUnit/L     15-37                      

 

             SGPT/ALT (test code = ALT)  IUnit/L     12-78                      

 

             ALKALINE PHOSPHATASE TOTAL (test code = ALKP)  IUnit/L       

                   





[id number] 20:06:00* 



             Test Item    Value        Reference Range Interpretation Comments

 

             GLUBED (test code = GLUBED) 175 mg/dL           H            

Performed by certified  

at [facility]





[id number] 15:22:00* 



             Test Item    Value        Reference Range Interpretation Comments

 

             GLUBED (test code = GLUBED) 124 mg/dL           H            

Performed by certified  

at [facility]





[id number] 11:39:00* 



             Test Item    Value        Reference Range Interpretation Comments

 

             GLUBED (test code = GLUBED) 129 mg/dL           H            

Performed by certified  

at [facility]





[id number] 07:40:00* 



             Test Item    Value        Reference Range Interpretation Comments

 

             GLUBED (test code = GLUBED) 101 mg/dL           N            

Performed by certified  

at [facility]





COMPREHENSIVE METABOLIC [id number] 07:36:00* 



             Test Item    Value        Reference Range Interpretation Comments

 

             SODIUM (test code = NA) 139 mmol/L   136-145      N             

 

             POTASSIUM (test code = K) 3.9 mmol/L   3.5-5.1      N             

 

             CHLORIDE (test code = CL) 100.0 mmol/L        N             

 

             CARBON DIOXIDE (test code = CO2) 30.0 mmol/L  21-32        N       

      

 

             ANION GAP (test code = GAP) 12.9         10-20        N            

 

 

             GLUCOSE (test code = GLU) 92 mg/dL            N             

 

             BLOOD UREA NITROGEN (test code = BUN) 19 mg/dL     7-18         H  

           

 

             GLOMERULAR FILTRATION RATE (test code = GFR) 54 mL/min    >=60     

                 Estimated GFR by 

using Modified MDRD formula.Chronic kidney disease is defined as either kidney 
damageor GFR <60 mL/min/1.73 m2 for >3 months.

 

             CREATININE (test code = CREAT) 1.30 mg/dL   0.7-1.3      N         

    

 

             BUN/CREATININE RATIO (test code = BUN/CREA) 15.0         10-20     

   N             

 

             TOTAL PROTEIN (test code = PROT) 6.0 gram/dL  6.4-8.2      L       

      

 

             ALBUMIN (test code = ALB) 2.6 g/dL     3.4-5.0      L             

 

             GLOBULIN (test code = GLOB) 3.4 gram/dL  2.7-4.2      N            

 

 

             ALBUMIN/GLOBULIN RATIO (test code = A/G) 0.8          0.75-1.50    

N             

 

             CALCIUM (test code = CA) 8.9 mg/dL    8.5-10.1     N             

 

             BILIRUBIN TOTAL (test code = BILT) 1.20 mg/dL   0.0-1.0      H     

        

 

             SGOT/AST (test code = AST) 16 IUnit/L   15-37        N             

 

             SGPT/ALT (test code = ALT) 21 IUnit/L   12-78        N             

 

             ALKALINE PHOSPHATASE TOTAL (test code = ALKP) 69 IUnit/L     

     N            **Note change 

in reference range due to change in reagent.**





BASIC METABOLIC [id number] 07:28:00* 



             Test Item    Value        Reference Range Interpretation Comments

 

             SODIUM (test code = NA) 137 mmol/L   136-145      N             

 

             POTASSIUM (test code = K) 3.8 mmol/L   3.5-5.1      N             

 

             CHLORIDE (test code = CL) 101.0 mmol/L        N             

 

             CARBON DIOXIDE (test code = CO2) 27.0 mmol/L  21-32        N       

      

 

             ANION GAP (test code = GAP) 12.8         10-20        N            

 

 

             GLUCOSE (test code = GLU) 93 mg/dL            N             

 

             BLOOD UREA NITROGEN (test code = BUN) 20 mg/dL     7-18         H  

           

 

             GLOMERULAR FILTRATION RATE (test code = GFR) 54 mL/min    >=60     

                 Estimated GFR by 

using Modified MDRD formula.Chronic kidney disease is defined as either kidney 
damageor GFR <60 mL/min/1.73 m2 for >3 months.

 

             CREATININE (test code = CREAT) 1.30 mg/dL   0.7-1.3      N         

    

 

             BUN/CREATININE RATIO (test code = BUN/CREA) 15.4         10-20     

   N             

 

             CALCIUM (test code = CA) 8.9 mg/dL    8.5-10.1     N             





COMPREHENSIVE METABOLIC [id number] 07:24:00* 



             Test Item    Value        Reference Range Interpretation Comments

 

             SODIUM (test code = NA) 139 mmol/L   136-145      N             

 

             POTASSIUM (test code = K) 3.9 mmol/L   3.5-5.1      N             

 

             CHLORIDE (test code = CL) 100.0 mmol/L        N             

 

             CARBON DIOXIDE (test code = CO2)  mmol/L      21-32                

      

 

             ANION GAP (test code = GAP)              10-20                     

 

 

             GLUCOSE (test code = GLU)  mg/dL                            

 

             BLOOD UREA NITROGEN (test code = BUN)  mg/dL       7-18            

           

 

             GLOMERULAR FILTRATION RATE (test code = GFR)  mL/min      >=60     

                  

 

             CREATININE (test code = CREAT)  mg/dL       0.7-1.3                

    

 

             BUN/CREATININE RATIO (test code = BUN/CREA)              10-20     

                 

 

             TOTAL PROTEIN (test code = PROT)  gram/dL     6.4-8.2              

      

 

             ALBUMIN (test code = ALB)  g/dL        3.4-5.0                    

 

             GLOBULIN (test code = GLOB)  gram/dL     2.7-4.2                   

 

 

             ALBUMIN/GLOBULIN RATIO (test code = A/G)              0.75-1.50    

              

 

             CALCIUM (test code = CA)  mg/dL       8.5-10.1                   

 

             BILIRUBIN TOTAL (test code = BILT)  mg/dL       0.0-1.0            

        

 

             SGOT/AST (test code = AST)  IUnit/L     15-37                      

 

             SGPT/ALT (test code = ALT)  IUnit/L     12-78                      

 

             ALKALINE PHOSPHATASE TOTAL (test code = ALKP)  IUnit/L       

                   





BASIC METABOLIC [id number] 07:22:00* 



             Test Item    Value        Reference Range Interpretation Comments

 

             SODIUM (test code = NA) 137 mmol/L   136-145      N             

 

             POTASSIUM (test code = K) 3.8 mmol/L   3.5-5.1      N             

 

             CHLORIDE (test code = CL) 101.0 mmol/L        N             

 

             CARBON DIOXIDE (test code = CO2)  mmol/L      21-32                

      

 

             ANION GAP (test code = GAP)              10-20                     

 

 

             GLUCOSE (test code = GLU)  mg/dL                            

 

             BLOOD UREA NITROGEN (test code = BUN)  mg/dL       7-18            

           

 

             GLOMERULAR FILTRATION RATE (test code = GFR)  mL/min      >=60     

                  

 

             CREATININE (test code = CREAT)  mg/dL       0.7-1.3                

    

 

             BUN/CREATININE RATIO (test code = BUN/CREA)              10-20     

                 

 

             CALCIUM (test code = CA)  mg/dL       8.5-10.1                   





CBC W/AUTO [id number] 07:21:00* 



             Test Item    Value        Reference Range Interpretation Comments

 

             WHITE BLOOD CELL (test code = WBC) 6.3 K/mm3    4.5-12.5     N     

        

 

             RED BLOOD CELL (test code = RBC) 4.51 mill/mm3 4.0-5.8      N      

       

 

             HEMOGLOBIN (test code = HGB) 12.1 gram/dL 13.0-17.5    L           

  

 

             HEMATOCRIT (test code = HCT) 38.2 %       42.0-52.0    L           

  

 

             MEAN CELL VOLUME (test code = MCV) 84.7 fL      80-98        N     

        

 

             MEAN CELL HGB (test code = MCH) 26.8 picogram 27.0-33.0    L       

      

 

             MEAN CELL HGB CONCETRATION (test code = MCHC) 31.7 gram/dL 33.0-36.

0    L             

 

             RED CELL DISTRIBUTION WIDTH (test code = RDW) 16.0 %       11.6-16.

2    N             

 

             RED CELL DISTRIBUTION WIDTH SD (test code = RDW-SD) 49.3 fL      37

.0-51.0    N             

 

             PLATELET COUNT (test code = PLT) 258 K/mm3    150-450      N       

      

 

             MEAN PLATELET VOLUME (test code = MPV) 10.9 fL      6.7-11.0     N 

            

 

             NEUTROPHIL % (test code = NT%) 64.5 %       39.0-69.0    N         

    

 

             IMMATURE GRANULOCYTE % (test code = IG%) 0.3 %        0.0-5.0      

N             

 

             LYMPHOCYTE % (test code = LY%) 12.2 %       25.0-55.0    L         

    

 

             MONOCYTE % (test code = MO%) 13.9 %       0.0-10.0     H           

  

 

             EOSINOPHIL % (test code = EO%) 7.7 %        0.0-5.0      H         

    

 

             BASOPHIL % (test code = BA%) 1.4 %        0.0-1.0      H           

  

 

             NUCLEATED RBC % (test code = NRBC%) 0.0 %        0-0          N    

         

 

             NEUTROPHIL # (test code = NT#) 4.08 K/mm3   1.8-7.7      N         

    

 

             IMMATURE GRANULOCYTE # (test code = IG#) 0.02 x10 3/uL 0-0.03      

 N             

 

             LYMPHOCYTE # (test code = LY#) 0.77 K/mm3   1.0-5.0      L         

    

 

             MONOCYTE # (test code = MO#) 0.88 K/mm3   0-0.8        H           

  

 

             EOSINOPHIL # (test code = EO#) 0.49 K/mm3   0.0-0.5      N         

    

 

             BASOPHIL # (test code = BA#) 0.09 K/mm3   0.0-0.2      N           

  

 

             NUCLEATED RBC # (test code = NRBC#) 0.00 K/mm3   0.0-0.1      N    

         

 

             MANUAL DIFF REQUIRED (test code = MDIFF) NO                        

              





[id number] 20:17:00* 



             Test Item    Value        Reference Range Interpretation Comments

 

             GLUBED (test code = GLUBED) 170 mg/dL           H            

Performed by certified  

at [facility]





[id number] 16:46:00* 



             Test Item    Value        Reference Range Interpretation Comments

 

             GLUBED (test code = GLUBED) 146 mg/dL           H            

Performed by certified  

at [facility]





[id number] 11:35:00* 



             Test Item    Value        Reference Range Interpretation Comments

 

             GLUBED (test code = GLUBED) 161 mg/dL           H            

Performed by certified  

at [facility]





[id number] 08:09:00* 



             Test Item    Value        Reference Range Interpretation Comments

 

             GLUBED (test code = GLUBED) 112 mg/dL           H            

Performed by certified  

at [facility]





[id number] 02:32:00* 



             Test Item    Value        Reference Range Interpretation Comments

 

             GLUBED (test code = GLUBED) 99 mg/dL            N            

Performed by certified  at

 [facility]





[id number] 22:36:00* 



             Test Item    Value        Reference Range Interpretation Comments

 

             TROPONIN-I (test code = TROPI) 0.054 ng/mL  0-0.045      HH        

   PREVIOUSLY CALLED





COMMENTS TO PHLEBOTOMIST: COLLECT 3 HOURS AFTER PREVIOUS                        
   [id number] 21:08:00* 



             Test Item    Value        Reference Range Interpretation Comments

 

             GLUBED (test code = GLUBED) 126 mg/dL           H            

Performed by certified  

[facility]~





[id number] 17:04:00* 



             Test Item    Value        Reference Range Interpretation Comments

 

             GLUBED (test code = GLUBED) 80 mg/dL            N            

Performed by certified  at

 [facility]





[id number] 17:04:00* 



             Test Item    Value        Reference Range Interpretation Comments

 

             GLUBED (test code = GLUBED) 80 mg/dL            N            

Performed by certified  at

 [facility]





B-TYPE NATRIURETIC [id number] 11:32:00* 



             Test Item    Value        Reference Range Interpretation Comments

 

             B-TYPE NATRIURETIC PEPTIDE (test code = BNP) 2460.39 pgram/mL 0-100

        H             





BASIC METABOLIC [id number] 11:21:00* 



             Test Item    Value        Reference Range Interpretation Comments

 

             SODIUM (test code = NA) 139 mmol/L   136-145      N             

 

             POTASSIUM (test code = K) 4.2 mmol/L   3.5-5.1      N             

 

             CHLORIDE (test code = CL) 102.0 mmol/L        N             

 

             CARBON DIOXIDE (test code = CO2) 31.0 mmol/L  21-32        N       

      

 

             ANION GAP (test code = GAP) 10.2         10-20        N            

 

 

             GLUCOSE (test code = GLU) 60 mg/dL            L             

 

             BLOOD UREA NITROGEN (test code = BUN) 13 mg/dL     7-18         N  

           

 

             GLOMERULAR FILTRATION RATE (test code = GFR) 60 mL/min    >=60     

                 Estimated GFR by 

using Modified MDRD formula.Chronic kidney disease is defined as either kidney 
damageor GFR <60 mL/min/1.73 m2 for >3 months.

 

             CREATININE (test code = CREAT) 1.20 mg/dL   0.7-1.3      N         

    

 

             BUN/CREATININE RATIO (test code = BUN/CREA) 11.1         10-20     

   N             

 

             CALCIUM (test code = CA) 8.9 mg/dL    8.5-10.1     N             





HEPATIC FUNCTION [id number] 11:21:00* 



             Test Item    Value        Reference Range Interpretation Comments

 

             TOTAL PROTEIN (test code = PROT) 7.1 gram/dL  6.4-8.2      N       

      

 

             ALBUMIN (test code = ALB) 2.8 g/dL     3.4-5.0      L             

 

             GLOBULIN (test code = GLOB) 4.3 gram/dL  2.7-4.2      H            

 

 

             ALBUMIN/GLOBULIN RATIO (test code = A/G) 0.7          0.75-1.50    

L             

 

             BILIRUBIN TOTAL (test code = BILT) 1.50 mg/dL   0.0-1.0      H     

        

 

             BILIRUBIN DIRECT (test code = BILD) 0.41 mg/dL   0.0-0.20     H    

         

 

             SGOT/AST (test code = AST) 29 IUnit/L   15-37        N             

 

             SGPT/ALT (test code = ALT) 28 IUnit/L   12-78        N             

 

             ALKALINE PHOSPHATASE TOTAL (test code = ALKP) 78 IUnit/L     

     N            **Note change 

in reference range due to change in reagent.**





[id number] 11:21:00* 



             Test Item    Value        Reference Range Interpretation Comments

 

             LIPASE (test code = LIP) 97 U/L       73.0-393.0   N             





[id number] 11:21:00* 



             Test Item    Value        Reference Range Interpretation Comments

 

             TROPONIN-I (test code = TROPI) 0.061 ng/mL  0-0.045      HH        

   Results called to [id number] 

by [email or web address] 20 [id number] results verified and read back by Nurse? Y





BASIC METABOLIC [id number] 11:11:00* 



             Test Item    Value        Reference Range Interpretation Comments

 

             SODIUM (test code = NA) 139 mmol/L   136-145      N             

 

             POTASSIUM (test code = K) 4.2 mmol/L   3.5-5.1      N             

 

             CHLORIDE (test code = CL) 102.0 mmol/L        N             

 

             CARBON DIOXIDE (test code = CO2)  mmol/L      21-32                

      

 

             ANION GAP (test code = GAP)              10-20                     

 

 

             GLUCOSE (test code = GLU)  mg/dL                            

 

             BLOOD UREA NITROGEN (test code = BUN)  mg/dL       7-18            

           

 

             GLOMERULAR FILTRATION RATE (test code = GFR)  mL/min      >=60     

                  

 

             CREATININE (test code = CREAT)  mg/dL       0.7-1.3                

    

 

             BUN/CREATININE RATIO (test code = BUN/CREA)              10-20     

                 

 

             CALCIUM (test code = CA)  mg/dL       8.5-10.1                   





HEPATIC FUNCTION [id number] 11:11:00* 



             Test Item    Value        Reference Range Interpretation Comments

 

             TOTAL PROTEIN (test code = PROT)  gram/dL     6.4-8.2              

      

 

             ALBUMIN (test code = ALB)  g/dL        3.4-5.0                    

 

             GLOBULIN (test code = GLOB)  gram/dL     2.7-4.2                   

 

 

             ALBUMIN/GLOBULIN RATIO (test code = A/G)              0.75-1.50    

              

 

             BILIRUBIN TOTAL (test code = BILT)  mg/dL       0.0-1.0            

        

 

             BILIRUBIN DIRECT (test code = BILD)  mg/dL       0.0-0.20          

         

 

             SGOT/AST (test code = AST)  IUnit/L     15-37                      

 

             SGPT/ALT (test code = ALT)  IUnit/L     12-78                      

 

             ALKALINE PHOSPHATASE TOTAL (test code = ALKP)  IUnit/L       

                   





[id number] 11:11:00* 



             Test Item    Value        Reference Range Interpretation Comments

 

             LIPASE (test code = LIP)  U/L         73.0-393.0                 





[id number] 11:11:00* 



             Test Item    Value        Reference Range Interpretation Comments

 

             TROPONIN-I (test code = TROPI)  ng/mL       0-0.045                

    





PROTHROMBIN [id number] 11:06:00* 



             Test Item    Value        Reference Range Interpretation Comments

 

             PROTHROMBIN TIME PATIENT (test code = PTP) 14.4 seconds 9.0-14.0   

  H             

 

             INTERNATIONAL NORMAL RATIO (test code = INR) 1.2          0.8-1.2  

    N            The therapeutic range

 for oral anticoagulant therapy form[employer] indications is an international 
normalized ratio (INR)of between 2.0 and 3.0.  The recommended therapeutic 
INRrange for various clinical situations is listed 
below:_________________________________________________________Clinical 
Situation                          INR 
range_________________________________________________________ Pulmonary e
mbolism treatment              (2.0-3.0)Venous thrombosis treatmentVenous 
thrombosis prophylaxis (high risk surgery)Prevention of systemic embolism from: 
        Acute myocardial infarction         Valvular heart disease         
Atrial fibrillation Mechanical prosthetic heart valves          (2.5-3.5)





IS PATIENT ON ANTICOAGULANTS? NTHROMBOPLASTIN TIME [id number] 11:06:00* 



             Test Item    Value        Reference Range Interpretation Comments

 

             THROMBOPLASTIN TIME PARTIAL (test code = PTT) 27.8 seconds 23.0-37.

0    N             





IS PATIENT ON ANTICOAGULANTS? NURINALYSIS [id number] 10:55:00* 



             Test Item    Value        Reference Range Interpretation Comments

 

             UA COLOR (test code = COLU) Light-Yellow YELLOW                    

 

 

             UA APPEARANCE (test code = APPU) CLEAR        CLEAR                

      

 

             UA GLUCOSE DIPSTICK (test code = DGLUU) NEGATIVE mg/dL NEGATIVE    

               

 

             UA BILIRUBIN DIPSTICK (test code = BILU) NEGATIVE mg/dL NEGATIVE   

                

 

             UA KETONE DIPSTICK (test code = KETU) NEGATIVE mg/dL NEGATIVE      

             

 

             UA SPECIFIC GRAVITY (test code = SGU) 1.006        1.001-1.035     

           

 

             UA BLOOD DIPSTICK (test code = PATRICIO) Negative mg/dL NEGATIVE        

           

 

             UA PH DIPSTICK (test code = LAWRENCE) 6.0          5.0-8.0              

      

 

             UA PROTEIN DIPSTICK (test code = PROU) NEGATIVE mg/dL NEGATIVE     

              

 

             UA UROBILINIOGEN DIPSTICK (test code = URO) Normal mg/dL NEGATIVE  

                 

 

             UA NITRITE DIPSTICK (test code = ELAYNE) NEGATIVE     NEGATIVE       

            

 

             UA LEUKOCYTE ESTERASE W REFLEX (test code = LEUUR) NEGATIVE Taya/uL 

NEGATIVE                   

 

             UA WBC (test code = WBCU) 0-5 per HPF  0-5                        

 

             UA RBC (test code = RBCU) 0-3 #/HPF    0-5                        

 

             UA EPITHELIAL CELLS (test code = EPIU) None seen per HPF FEW       

                 

 

             UA BACTERIA (test code = BACU) NONE SEEN #/HPF NONE                

       

 

             UA MUCUS (test code = MUCU) FEW #/LPF    FEW                       

 





Urine Source? Clean CatchCBC W/O [id number] 10:50:00* 



             Test Item    Value        Reference Range Interpretation Comments

 

             WHITE BLOOD CELL (test code = WBC) 7.8 K/mm3    4.5-12.5     N     

        

 

             RED BLOOD CELL (test code = RBC) 4.70 mill/mm3 4.0-5.8      N      

       

 

             HEMOGLOBIN (test code = HGB) 12.9 gram/dL 13.0-17.5    L           

  

 

             HEMATOCRIT (test code = HCT) 39.8 %       42.0-52.0    L           

  

 

             MEAN CELL VOLUME (test code = MCV) 84.7 fL      80-98        N     

        

 

             MEAN CELL HGB (test code = MCH) 27.4 picogram 27.0-33.0    N       

      

 

             MEAN CELL HGB CONCETRATION (test code = MCHC) 32.4 gram/dL 33.0-36.

0    L             

 

             RED CELL DISTRIBUTION WIDTH (test code = RDW) 16.0 %       11.6-16.

2    N             

 

             PLATELET COUNT (test code = PLT) 277 K/mm3    150-450      N       

      

 

             MEAN PLATELET VOLUME (test code = MPV) 10.6 fL      6.7-11.0     N 

            





- XR CHEST [id number] 10:49:00 FAX: Adela Coello [phone]
    Raymond:    St: PRE FAX:         Haley Munoz  DO                 
------------------------------------------------------------------------------- 
 Name:   JUSTIN MACK           [facility]                     
: 1950  Age/S: 69/M           [address] [facility]                Unit #: 
[id number]      Loc: [facility]  [place]  [address]              Phys: 
Haley Munoz DO                                                 Acct: 
[id number] Dis Date:               Status: PRE ER                             
    PHONE #: [phone]     Exam Date:     2020     1043                
   FAX #: [phone]     Reason: chf                                          
      EXAMS:                                               CPT CODE:      
[id number] XR CHEST 1 V                               [id number]                    
HISTORY: CHF.               COMPARISON: 2019.               Location: 
[facility].               Brachiocephalic stent noted on the left side. Interstitial 
infiltrates       or edema. Trace left effusion. Dependent changes. 
Cardiomegaly.                 IMPRESSION:                   Mild interstitial e
rosemary or infiltrate.          ** Electronically Signed by VINOD Patino on  at 1049 **                      Reported and signed by: ISMA Hawkins                           CC: Adela Bañuelos MD; Haley Munoz DO     
                                                                                
          Technologist: Fauzia SANDOVAL(CHRIS)                                   SAHRA
rnscrd Date/Time/By: 2020 (2994) : By: [email or web address]           Orig Print D/T:
 S: 2020 (8924)                         PAGE  1                       Sign
ed Report                               COMPREHENSIVE METABOLIC [id number] 
06:39:00* 



             Test Item    Value        Reference Range Interpretation Comments

 

             SODIUM (test code = NA) 142 mmol/L   136-145      N             

 

             POTASSIUM (test code = K) 4.4 mmol/L   3.5-5.1      N             

 

             CHLORIDE (test code = CL) 107.0 mmol/L        N             

 

             CARBON DIOXIDE (test code = CO2) 29.0 mmol/L  21-32        N       

      

 

             ANION GAP (test code = GAP) 10.4         10-20        N            

 

 

             GLUCOSE (test code = GLU) 156 mg/dL           H             

 

             BLOOD UREA NITROGEN (test code = BUN) 15 mg/dL     7-18         N  

           

 

             GLOMERULAR FILTRATION RATE (test code = GFR) 60 mL/min    >=60     

                 Estimated GFR by 

using Modified MDRD formula.Chronic kidney disease is defined as either kidney 
damageor GFR <60 mL/min/1.73 m2 for >3 months.

 

             CREATININE (test code = CREAT) 1.20 mg/dL   0.7-1.3      N         

    

 

             BUN/CREATININE RATIO (test code = BUN/CREA) 12.4         10-20     

   N             

 

             TOTAL PROTEIN (test code = PROT) 6.0 gram/dL  6.4-8.2      L       

      

 

             ALBUMIN (test code = ALB) 2.5 g/dL     3.4-5.0      L             

 

             GLOBULIN (test code = GLOB) 3.5 gram/dL  2.7-4.2      N            

 

 

             ALBUMIN/GLOBULIN RATIO (test code = A/G) 0.7          0.75-1.50    

L             

 

             CALCIUM (test code = CA) 8.1 mg/dL    8.5-10.1     L             

 

             BILIRUBIN TOTAL (test code = BILT) 0.60 mg/dL   0.0-1.0      N     

        

 

             SGOT/AST (test code = AST) 9 IUnit/L    15-37        L             

 

             SGPT/ALT (test code = ALT) 17 IUnit/L   12-78        N             

 

             ALKALINE PHOSPHATASE TOTAL (test code = ALKP) 50 IUnit/L     

     N            **Note change 

in reference range due to change in reagent.**





COMPREHENSIVE METABOLIC [id number] 06:36:00* 



             Test Item    Value        Reference Range Interpretation Comments

 

             SODIUM (test code = NA) 142 mmol/L   136-145      N             

 

             POTASSIUM (test code = K) 4.4 mmol/L   3.5-5.1      N             

 

             CHLORIDE (test code = CL) 107.0 mmol/L        N             

 

             CARBON DIOXIDE (test code = CO2)  mmol/L      21-32                

      

 

             ANION GAP (test code = GAP)              10-20                     

 

 

             GLUCOSE (test code = GLU)  mg/dL                            

 

             BLOOD UREA NITROGEN (test code = BUN)  mg/dL       7-18            

           

 

             GLOMERULAR FILTRATION RATE (test code = GFR)  mL/min      >=60     

                  

 

             CREATININE (test code = CREAT)  mg/dL       0.7-1.3                

    

 

             BUN/CREATININE RATIO (test code = BUN/CREA)              10-20     

                 

 

             TOTAL PROTEIN (test code = PROT)  gram/dL     6.4-8.2              

      

 

             ALBUMIN (test code = ALB)  g/dL        3.4-5.0                    

 

             GLOBULIN (test code = GLOB)  gram/dL     2.7-4.2                   

 

 

             ALBUMIN/GLOBULIN RATIO (test code = A/G)              0.75-1.50    

              

 

             CALCIUM (test code = CA)  mg/dL       8.5-10.1                   

 

             BILIRUBIN TOTAL (test code = BILT)  mg/dL       0.0-1.0            

        

 

             SGOT/AST (test code = AST)  IUnit/L     15-37                      

 

             SGPT/ALT (test code = ALT)  IUnit/L     12-78                      

 

             ALKALINE PHOSPHATASE TOTAL (test code = ALKP)  IUnit/L       

                   





[id number] 06:20:00* 



             Test Item    Value        Reference Range Interpretation Comments

 

             GLUBED (test code = GLUBED) 150 mg/dL           H            

Performed by certified  

at [facility]





CBC W/AUTO [id number] 05:54:00* 



             Test Item    Value        Reference Range Interpretation Comments

 

             WHITE BLOOD CELL (test code = WBC) 8.0 K/mm3    4.5-12.5     N     

        

 

             RED BLOOD CELL (test code = RBC) 4.48 mill/mm3 4.0-5.8      N      

       

 

             HEMOGLOBIN (test code = HGB) 13.4 gram/dL 13.0-17.5    N           

  

 

             HEMATOCRIT (test code = HCT) 40.7 %       42.0-52.0    L           

  

 

             MEAN CELL VOLUME (test code = MCV) 90.8 fL      80-98        N     

        

 

             MEAN CELL HGB (test code = MCH) 29.9 picogram 27.0-33.0    N       

      

 

             MEAN CELL HGB CONCETRATION (test code = MCHC) 32.9 gram/dL 33.0-36.

0    L             

 

             RED CELL DISTRIBUTION WIDTH (test code = RDW) 12.3 %       11.6-16.

2    N             

 

             RED CELL DISTRIBUTION WIDTH SD (test code = RDW-SD) 40.8 fL      37

.0-51.0    N             

 

             PLATELET COUNT (test code = PLT) 165 K/mm3    150-450      N       

      

 

             MEAN PLATELET VOLUME (test code = MPV) 12.1 fL      6.7-11.0     H 

            

 

             NEUTROPHIL % (test code = NT%) 68.9 %       39.0-69.0    N         

    

 

             IMMATURE GRANULOCYTE % (test code = IG%) 0.4 %        0.0-5.0      

N             

 

             LYMPHOCYTE % (test code = LY%) 16.4 %       25.0-55.0    L         

    

 

             MONOCYTE % (test code = MO%) 8.9 %        0.0-10.0     N           

  

 

             EOSINOPHIL % (test code = EO%) 4.6 %        0.0-5.0      N         

    

 

             BASOPHIL % (test code = BA%) 0.8 %        0.0-1.0      N           

  

 

             NUCLEATED RBC % (test code = NRBC%) 0.0 %        0-0          N    

         

 

             NEUTROPHIL # (test code = NT#) 5.51 K/mm3   1.8-7.7      N         

    

 

             IMMATURE GRANULOCYTE # (test code = IG#) 0.03 x10 3/uL 0-0.03      

 N             

 

             LYMPHOCYTE # (test code = LY#) 1.31 K/mm3   1.0-5.0      N         

    

 

             MONOCYTE # (test code = MO#) 0.71 K/mm3   0-0.8        N           

  

 

             EOSINOPHIL # (test code = EO#) 0.37 K/mm3   0.0-0.5      N         

    

 

             BASOPHIL # (test code = BA#) 0.06 K/mm3   0.0-0.2      N           

  

 

             NUCLEATED RBC # (test code = NRBC#) 0.00 K/mm3   0.0-0.1      N    

         





[id number] 22:09:00* 



             Test Item    Value        Reference Range Interpretation Comments

 

             GLUBED (test code = GLUBED) 339 mg/dL           H            

Performed by certified  

at [facility]





COAGULATION TIME [id number] 10:37:00* 



             Test Item    Value        Reference Range Interpretation Comments

 

             COAGULATION TIME ACTIVATED (test code = ACT) 147 seconds  62.8-88.0

    H             





[id number] 09:03:00* 



             Test Item    Value        Reference Range Interpretation Comments

 

             GLUBED (test code = GLUBED) 197 mg/dL           H            

Performed by certified  

at [facility]





COMPREHENSIVE METABOLIC [id number] 10:46:00* 



             Test Item    Value        Reference Range Interpretation Comments

 

             SODIUM (test code = NA) 140 mmol/L   136-145      N             

 

             POTASSIUM (test code = K) 4.4 mmol/L   3.5-5.1      N             

 

             CHLORIDE (test code = CL) 105.0 mmol/L        N             

 

             CARBON DIOXIDE (test code = CO2) 28.0 mmol/L  21-32        N       

      

 

             ANION GAP (test code = GAP) 11.4         10-20        N            

 

 

             GLUCOSE (test code = GLU) 207 mg/dL           H             

 

             BLOOD UREA NITROGEN (test code = BUN) 17 mg/dL     7-18         N  

           

 

             GLOMERULAR FILTRATION RATE (test code = GFR) 55 mL/min    >=60     

                 Estimated GFR by 

using Modified MDRD formula.Chronic kidney disease is defined as either kidney 
damageor GFR <60 mL/min/1.73 m2 for >3 months.

 

             CREATININE (test code = CREAT) 1.30 mg/dL   0.7-1.3      N         

    

 

             BUN/CREATININE RATIO (test code = BUN/CREA) 13.0         10-20     

   N             

 

             TOTAL PROTEIN (test code = PROT) 7.6 gram/dL  6.4-8.2      N       

      

 

             ALBUMIN (test code = ALB) 3.3 g/dL     3.4-5.0      L             

 

             GLOBULIN (test code = GLOB) 4.3 gram/dL  2.7-4.2      H            

 

 

             ALBUMIN/GLOBULIN RATIO (test code = A/G) 0.8          0.75-1.50    

N             

 

             CALCIUM (test code = CA) 8.9 mg/dL    8.5-10.1     N             

 

             BILIRUBIN TOTAL (test code = BILT) 0.90 mg/dL   0.0-1.0      N     

        

 

             SGOT/AST (test code = AST) 11 IUnit/L   15-37        L             

 

             SGPT/ALT (test code = ALT) 24 IUnit/L   12-78        N             

 

             ALKALINE PHOSPHATASE TOTAL (test code = ALKP) 65 IUnit/L     

     N            **Note change 

in reference range due to change in reagent.**





LIPID PROFILE (CORONARY RISK)2019 10:46:00* 



             Test Item    Value        Reference Range Interpretation Comments

 

             TRIGLYCERIDES (test code = TRIG) 97 mg/dL            N       

      

 

             CHOLESTEROL (test code = CHOL) 134 mg/dL    0-200        N         

    

 

             CHOLESTEROL/HDL RATIO (test code = CHOLHDL) 3.0 RATIO    0-4.9     

   N            RISK ASSOCIATED 

WITH CHOL/HDL RATIOS:     Risk          Male       Female1/2 AVERAGE        3.43
        3.27AVERAGE            4.97        4.442X AVERAGE         9.55        
7.053X AVERAGE         23.39      11.04 REFERENCE VALUE IS RELATED TO RISK 
LEVELS ASRECOMMENDED BY THE SCOTTY. HEART, LUNG, AND BLOOD INST.

 

             HDL CHOLESTEROL (test code = HDL) 42 mg/dL     40-60        N      

       

 

             LIPOPROTEIN LDL (test code = LDL) 80 mg/dL     100-129      L      

      

===========================================================Reference Interval:  
        mg/dL          
mmol/L-----------------------------------------------------------Optimal        
              <100           <2.6Near/above optimal          100-129        2.6-
3.3Borderline High             130-159        3.4-4.1High                       
 160-189        4.1-4.9Very High                    &gt;=190          
>=4.9========= This LDL result is a direct measurement.=========





COMPREHENSIVE METABOLIC [id number] 10:40:00* 



             Test Item    Value        Reference Range Interpretation Comments

 

             SODIUM (test code = NA) 140 mmol/L   136-145      N             

 

             POTASSIUM (test code = K) 4.4 mmol/L   3.5-5.1      N             

 

             CHLORIDE (test code = CL) 105.0 mmol/L        N             

 

             CARBON DIOXIDE (test code = CO2)  mmol/L      21-32                

      

 

             ANION GAP (test code = GAP)              10-20                     

 

 

             GLUCOSE (test code = GLU)  mg/dL                            

 

             BLOOD UREA NITROGEN (test code = BUN)  mg/dL       7-18            

           

 

             GLOMERULAR FILTRATION RATE (test code = GFR)  mL/min      >=60     

                  

 

             CREATININE (test code = CREAT)  mg/dL       0.7-1.3                

    

 

             BUN/CREATININE RATIO (test code = BUN/CREA)              10-20     

                 

 

             TOTAL PROTEIN (test code = PROT)  gram/dL     6.4-8.2              

      

 

             ALBUMIN (test code = ALB)  g/dL        3.4-5.0                    

 

             GLOBULIN (test code = GLOB)  gram/dL     2.7-4.2                   

 

 

             ALBUMIN/GLOBULIN RATIO (test code = A/G)              0.75-1.50    

              

 

             CALCIUM (test code = CA)  mg/dL       8.5-10.1                   

 

             BILIRUBIN TOTAL (test code = BILT)  mg/dL       0.0-1.0            

        

 

             SGOT/AST (test code = AST)  IUnit/L     15-37                      

 

             SGPT/ALT (test code = ALT)  IUnit/L     12-78                      

 

             ALKALINE PHOSPHATASE TOTAL (test code = ALKP)  IUnit/L       

                   





LIPID PROFILE (CORONARY RISK)2019 10:40:00* 



             Test Item    Value        Reference Range Interpretation Comments

 

             TRIGLYCERIDES (test code = TRIG)  mg/dL                      

      

 

             CHOLESTEROL (test code = CHOL)  mg/dL       0-200                  

    

 

             CHOLESTEROL/HDL RATIO (test code = CHOLHDL)  RATIO       0-4.9     

                 

 

             HDL CHOLESTEROL (test code = HDL)  mg/dL       40-60               

       

 

             LIPOPROTEIN LDL (test code = LDL)  mg/dL       100-129             

       





CBC W/AUTO [id number] 10:15:00* 



             Test Item    Value        Reference Range Interpretation Comments

 

             WHITE BLOOD CELL (test code = WBC) 8.0 K/mm3    4.5-12.5     N     

        

 

             RED BLOOD CELL (test code = RBC) 5.38 mill/mm3 4.0-5.8      N      

       

 

             HEMOGLOBIN (test code = HGB) 16.0 gram/dL 13.0-17.5    N           

  

 

             HEMATOCRIT (test code = HCT) 48.0 %       42.0-52.0    N           

  

 

             MEAN CELL VOLUME (test code = MCV) 89.2 fL      80-98        N     

        

 

             MEAN CELL HGB (test code = MCH) 29.7 picogram 27.0-33.0    N       

      

 

             MEAN CELL HGB CONCETRATION (test code = MCHC) 33.3 gram/dL 33.0-36.

0    N             

 

             RED CELL DISTRIBUTION WIDTH (test code = RDW) 12.3 %       11.6-16.

2    N             

 

             RED CELL DISTRIBUTION WIDTH SD (test code = RDW-SD) 40.2 fL      37

.0-51.0    N             

 

             PLATELET COUNT (test code = PLT) 198 K/mm3    150-450      N       

      

 

             MEAN PLATELET VOLUME (test code = MPV) 11.5 fL      6.7-11.0     H 

            

 

             NEUTROPHIL % (test code = NT%) 71.8 %       39.0-69.0    H         

    

 

             IMMATURE GRANULOCYTE % (test code = IG%) 0.4 %        0.0-5.0      

N             

 

             LYMPHOCYTE % (test code = LY%) 16.8 %       25.0-55.0    L         

    

 

             MONOCYTE % (test code = MO%) 6.8 %        0.0-10.0     N           

  

 

             EOSINOPHIL % (test code = EO%) 3.4 %        0.0-5.0      N         

    

 

             BASOPHIL % (test code = BA%) 0.8 %        0.0-1.0      N           

  

 

             NUCLEATED RBC % (test code = NRBC%) 0.0 %        0-0          N    

         

 

             NEUTROPHIL # (test code = NT#) 5.74 K/mm3   1.8-7.7      N         

    

 

             IMMATURE GRANULOCYTE # (test code = IG#) 0.03 x10 3/uL 0-0.03      

 N             

 

             LYMPHOCYTE # (test code = LY#) 1.34 K/mm3   1.0-5.0      N         

    

 

             MONOCYTE # (test code = MO#) 0.54 K/mm3   0-0.8        N           

  

 

             EOSINOPHIL # (test code = EO#) 0.27 K/mm3   0.0-0.5      N         

    

 

             BASOPHIL # (test code = BA#) 0.06 K/mm3   0.0-0.2      N           

  

 

             NUCLEATED RBC # (test code = NRBC#) 0.00 K/mm3   0.0-0.1      N    

         

 

             MANUAL DIFF REQUIRED (test code = MDIFF) NO                        

              





CBC W/AUTO [id number] 10:06:00* 



             Test Item    Value        Reference Range Interpretation Comments

 

             WHITE BLOOD CELL (test code = WBC)  K/mm3       4.5-12.5           

        

 

             RED BLOOD CELL (test code = RBC)  mill/mm3    4.0-5.8              

      

 

             HEMOGLOBIN (test code = HGB) 16.0 gram/dL 13.0-17.5    N           

  

 

             HEMATOCRIT (test code = HCT) 48.0 %       42.0-52.0    N           

  

 

             MEAN CELL VOLUME (test code = MCV)  fL          80-98              

        

 

             MEAN CELL HGB (test code = MCH)  picogram    27.0-33.0             

     

 

             MEAN CELL HGB CONCETRATION (test code = MCHC)  gram/dL     33.0-36.

0                  

 

             RED CELL DISTRIBUTION WIDTH (test code = RDW)  %           11.6-16.

2                  

 

             RED CELL DISTRIBUTION WIDTH SD (test code = RDW-SD)  fL          37

.0-51.0                  

 

             PLATELET COUNT (test code = PLT)  K/mm3       150-450              

      

 

             MEAN PLATELET VOLUME (test code = MPV)  fL          6.7-11.0       

            

 

             NEUTROPHIL % (test code = NT%)  %           39.0-69.0              

    

 

             IMMATURE GRANULOCYTE % (test code = IG%)  %           0.0-5.0      

              

 

             LYMPHOCYTE % (test code = LY%)  %           25.0-55.0              

    

 

             MONOCYTE % (test code = MO%)  %           0.0-10.0                 

  

 

             EOSINOPHIL % (test code = EO%)  %           0.0-5.0                

    

 

             BASOPHIL % (test code = BA%)  %           0.0-1.0                  

  

 

             NEUTROPHIL # (test code = NT#)  K/mm3       1.8-7.7                

    

 

             LYMPHOCYTE # (test code = LY#)  K/mm3       1.0-5.0                

    

 

             MONOCYTE # (test code = MO#)  K/mm3       0-0.8                    

  

 

             EOSINOPHIL # (test code = EO#)  K/mm3       0.0-0.5                

    

 

             BASOPHIL # (test code = BA#)  K/mm3       0.0-0.2                  

  





COAGULATION TIME [id number] 14:10:00* 



             Test Item    Value        Reference Range Interpretation Comments

 

             COAGULATION TIME ACTIVATED (test code = ACT) 124 seconds  62.8-88.0

    H             





[id number] 11:53:00* 



             Test Item    Value        Reference Range Interpretation Comments

 

             GLUBED (test code = GLUBED) 208 mg/dL           H            

Performed by certified  

at [facility]





BASIC METABOLIC [id number] 05:25:00* 



             Test Item    Value        Reference Range Interpretation Comments

 

             SODIUM (test code = NA) 140 mmol/L   136-145      N             

 

             POTASSIUM (test code = K) 4.0 mmol/L   3.5-5.1      N             

 

             CHLORIDE (test code = CL) 106.0 mmol/L        N             

 

             CARBON DIOXIDE (test code = CO2)  mmol/L      21-32                

      

 

             ANION GAP (test code = GAP)              10-20                     

 

 

             GLUCOSE (test code = GLU)  mg/dL                            

 

             BLOOD UREA NITROGEN (test code = BUN)  mg/dL       7-18            

           

 

             GLOMERULAR FILTRATION RATE (test code = GFR)  mL/min      >=60     

                  

 

             CREATININE (test code = CREAT)  mg/dL       0.7-1.3                

    

 

             BUN/CREATININE RATIO (test code = BUN/CREA)              10-20     

                 

 

             CALCIUM (test code = CA)  mg/dL       8.5-10.1                   





BASIC METABOLIC [id number] 05:25:00* 



             Test Item    Value        Reference Range Interpretation Comments

 

             SODIUM (test code = NA) 140 mmol/L   136-145      N             

 

             POTASSIUM (test code = K) 4.0 mmol/L   3.5-5.1      N             

 

             CHLORIDE (test code = CL) 106.0 mmol/L        N             

 

             CARBON DIOXIDE (test code = CO2) 28.0 mmol/L  21-32        N       

      

 

             ANION GAP (test code = GAP) 10.0         10-20        N            

 

 

             GLUCOSE (test code = GLU) 162 mg/dL           H             

 

             BLOOD UREA NITROGEN (test code = BUN) 15 mg/dL     7-18         N  

           

 

             GLOMERULAR FILTRATION RATE (test code = GFR) > 60 mL/min  >=60     

                 Estimated GFR by

 using Modified MDRD formula.Chronic kidney disease is defined as either kidney 
damageor GFR <60 mL/min/1.73 m2 for >3 months.

 

             CREATININE (test code = CREAT) 1.10 mg/dL   0.7-1.3      N         

    

 

             BUN/CREATININE RATIO (test code = BUN/CREA) 13.6         10-20     

   N             

 

             CALCIUM (test code = CA) 8.0 mg/dL    8.5-10.1     L             





[id number] 05:17:00* 



             Test Item    Value        Reference Range Interpretation Comments

 

             GLUBED (test code = GLUBED) 165 mg/dL           H            

Performed by certified  

at [facility]





CBC W/AUTO [id number] 04:58:00* 



             Test Item    Value        Reference Range Interpretation Comments

 

             WHITE BLOOD CELL (test code = WBC) 7.9 K/mm3    4.5-12.5     N     

        

 

             RED BLOOD CELL (test code = RBC) 4.73 mill/mm3 4.0-5.8      N      

       

 

             HEMOGLOBIN (test code = HGB) 13.9 gram/dL 13.0-17.5    N           

  

 

             HEMATOCRIT (test code = HCT) 43.1 %       42.0-52.0    N           

  

 

             MEAN CELL VOLUME (test code = MCV) 91.1 fL      80-98        N     

        

 

             MEAN CELL HGB (test code = MCH) 29.4 picogram 27.0-33.0    N       

      

 

             MEAN CELL HGB CONCETRATION (test code = MCHC) 32.3 gram/dL 33.0-36.

0    L             

 

             RED CELL DISTRIBUTION WIDTH (test code = RDW) 12.1 %       11.6-16.

2    N             

 

             RED CELL DISTRIBUTION WIDTH SD (test code = RDW-SD) 40.4 fL      37

.0-51.0    N             

 

             PLATELET COUNT (test code = PLT) 175 K/mm3    150-450      N       

      

 

             MEAN PLATELET VOLUME (test code = MPV) 11.6 fL      6.7-11.0     H 

            

 

             NEUTROPHIL % (test code = NT%) 64.8 %       39.0-69.0    N         

    

 

             IMMATURE GRANULOCYTE % (test code = IG%) 0.5 %        0.0-5.0      

N             

 

             LYMPHOCYTE % (test code = LY%) 19.5 %       25.0-55.0    L         

    

 

             MONOCYTE % (test code = MO%) 8.8 %        0.0-10.0     N           

  

 

             EOSINOPHIL % (test code = EO%) 5.5 %        0.0-5.0      H         

    

 

             BASOPHIL % (test code = BA%) 0.9 %        0.0-1.0      N           

  

 

             NUCLEATED RBC % (test code = NRBC%) 0.0 %        0-0          N    

         

 

             NEUTROPHIL # (test code = NT#) 5.10 K/mm3   1.8-7.7      N         

    

 

             IMMATURE GRANULOCYTE # (test code = IG#) 0.04 x10 3/uL 0-0.03      

 H             

 

             LYMPHOCYTE # (test code = LY#) 1.53 K/mm3   1.0-5.0      N         

    

 

             MONOCYTE # (test code = MO#) 0.69 K/mm3   0-0.8        N           

  

 

             EOSINOPHIL # (test code = EO#) 0.43 K/mm3   0.0-0.5      N         

    

 

             BASOPHIL # (test code = BA#) 0.07 K/mm3   0.0-0.2      N           

  

 

             NUCLEATED RBC # (test code = NRBC#) 0.00 K/mm3   0.0-0.1      N    

         





CBC W/AUTO [id number] 04:52:00* 



             Test Item    Value        Reference Range Interpretation Comments

 

             WHITE BLOOD CELL (test code = WBC)  K/mm3       4.5-12.5           

        

 

             RED BLOOD CELL (test code = RBC)  mill/mm3    4.0-5.8              

      

 

             HEMOGLOBIN (test code = HGB) 13.9 gram/dL 13.0-17.5    N           

  

 

             HEMATOCRIT (test code = HCT) 43.1 %       42.0-52.0    N           

  

 

             MEAN CELL VOLUME (test code = MCV)  fL          80-98              

        

 

             MEAN CELL HGB (test code = MCH)  picogram    27.0-33.0             

     

 

             MEAN CELL HGB CONCETRATION (test code = MCHC)  gram/dL     33.0-36.

0                  

 

             RED CELL DISTRIBUTION WIDTH (test code = RDW)  %           11.6-16.

2                  

 

             RED CELL DISTRIBUTION WIDTH SD (test code = RDW-SD)  fL          37

.0-51.0                  

 

             PLATELET COUNT (test code = PLT)  K/mm3       150-450              

      

 

             MEAN PLATELET VOLUME (test code = MPV)  fL          6.7-11.0       

            

 

             NEUTROPHIL % (test code = NT%)  %           39.0-69.0              

    

 

             IMMATURE GRANULOCYTE % (test code = IG%)  %           0.0-5.0      

              

 

             LYMPHOCYTE % (test code = LY%)  %           25.0-55.0              

    

 

             MONOCYTE % (test code = MO%)  %           0.0-10.0                 

  

 

             EOSINOPHIL % (test code = EO%)  %           0.0-5.0                

    

 

             BASOPHIL % (test code = BA%)  %           0.0-1.0                  

  

 

             NEUTROPHIL # (test code = NT#)  K/mm3       1.8-7.7                

    

 

             LYMPHOCYTE # (test code = LY#)  K/mm3       1.0-5.0                

    

 

             MONOCYTE # (test code = MO#)  K/mm3       0-0.8                    

  

 

             EOSINOPHIL # (test code = EO#)  K/mm3       0.0-0.5                

    

 

             BASOPHIL # (test code = BA#)  K/mm3       0.0-0.2                  

  





COAGULATION TIME [id number] 23:16:00* 



             Test Item    Value        Reference Range Interpretation Comments

 

             COAGULATION TIME ACTIVATED (test code = ACT) 197 seconds  62.8-88.0

    H             





[id number] 21:21:00* 



             Test Item    Value        Reference Range Interpretation Comments

 

             GLUBED (test code = GLUBED) 184 mg/dL           H            

Performed by certified  

at [facility]





[id number] 15:59:00* 



             Test Item    Value        Reference Range Interpretation Comments

 

             GLUBED (test code = GLUBED) 266 mg/dL           H            

Performed by certified  

at [facility]





[id number] 11:28:00* 



             Test Item    Value        Reference Range Interpretation Comments

 

             GLUBED (test code = GLUBED) 191 mg/dL           H            

Performed by certified  

at [facility]





[id number] 07:58:00* 



             Test Item    Value        Reference Range Interpretation Comments

 

             GLUBED (test code = GLUBED) 232 mg/dL           H            

Performed by certified  

at [facility]





COMPREHENSIVE METABOLIC [id number] 11:54:00* 



             Test Item    Value        Reference Range Interpretation Comments

 

             SODIUM (test code = NA) 138 mmol/L   136-145      N             

 

             POTASSIUM (test code = K) 4.5 mmol/L   3.5-5.1      N             

 

             CHLORIDE (test code = CL) 104.0 mmol/L        N             

 

             CARBON DIOXIDE (test code = CO2) 26.0 mmol/L  21-32        N       

      

 

             ANION GAP (test code = GAP) 12.5         10-20        N            

 

 

             GLUCOSE (test code = GLU) 248 mg/dL           H             

 

             BLOOD UREA NITROGEN (test code = BUN) 19 mg/dL     7-18         H  

           

 

             GLOMERULAR FILTRATION RATE (test code = GFR) 50 mL/min    >=60     

                 Estimated GFR by 

using Modified MDRD formula.Chronic kidney disease is defined as either kidney 
damageor GFR <60 mL/min/1.73 m2 for >3 months.

 

             CREATININE (test code = CREAT) 1.40 mg/dL   0.7-1.3      H         

    

 

             BUN/CREATININE RATIO (test code = BUN/CREA) 13.6         10-20     

   N             

 

             TOTAL PROTEIN (test code = PROT) 7.6 gram/dL  6.4-8.2      N       

      

 

             ALBUMIN (test code = ALB) 3.3 g/dL     3.4-5.0      L             

 

             GLOBULIN (test code = GLOB) 4.3 gram/dL  2.7-4.2      H            

 

 

             ALBUMIN/GLOBULIN RATIO (test code = A/G) 0.8          0.75-1.50    

N             

 

             CALCIUM (test code = CA) 9.1 mg/dL    8.5-10.1     N             

 

             BILIRUBIN TOTAL (test code = BILT) 0.80 mg/dL   0.0-1.0      N     

        

 

             SGOT/AST (test code = AST) 15 IUnit/L   15-37        N             

 

             SGPT/ALT (test code = ALT) 30 IUnit/L   12-78        N             

 

             ALKALINE PHOSPHATASE TOTAL (test code = ALKP) 68 IUnit/L     

     N            **Note change 

in reference range due to change in reagent.**





LIPID PROFILE (CORONARY RISK)2019 11:54:00* 



             Test Item    Value        Reference Range Interpretation Comments

 

             TRIGLYCERIDES (test code = TRIG) 97 mg/dL            N       

      

 

             CHOLESTEROL (test code = CHOL) 125 mg/dL    0-200        N         

    

 

             CHOLESTEROL/HDL RATIO (test code = CHOLHDL) 2.0 RATIO    0-4.9     

   N            RISK ASSOCIATED 

WITH CHOL/HDL RATIOS:     Risk          Male       Female1/2 AVERAGE        3.43
        3.27AVERAGE            4.97        4.442X AVERAGE         9.55        
7.053X AVERAGE         23.39      11.04 REFERENCE VALUE IS RELATED TO RISK 
LEVELS ASRECOMMENDED BY THE SCOTTY. HEART, LUNG, AND BLOOD INST.

 

             HDL CHOLESTEROL (test code = HDL) 47 mg/dL     40-60        N      

       

 

             LIPOPROTEIN LDL (test code = LDL) 70 mg/dL     100-129      L      

      

===========================================================Reference Interval:  
        mg/dL          
mmol/L-----------------------------------------------------------Optimal        
              <100           <2.6Near/above optimal          100-129        2.6-
3.3Borderline High             130-159        3.4-4.1High                       
 160-189        4.1-4.9Very High                    &gt;=190          
>=4.9========= This LDL result is a direct measurement.=========





THYROID STIMULATING [id number] 11:54:00* 



             Test Item    Value        Reference Range Interpretation Comments

 

             THYROID STIMULATING HORMONE (test code = TSH) 1.120 uIU/mL 0.36-3.7

4    N            TSH 

REFERENCE RANGES:  EUTHYROID:     0.35 - 4.3 mIU/mL                       HYPO  
   :     > 5.5      mIU/mL                       HYPER    :     < 0.35     
mIU/mL





COMPREHENSIVE METABOLIC [id number] 11:40:00* 



             Test Item    Value        Reference Range Interpretation Comments

 

             SODIUM (test code = NA) 138 mmol/L   136-145      N             

 

             POTASSIUM (test code = K) 4.5 mmol/L   3.5-5.1      N             

 

             CHLORIDE (test code = CL) 104.0 mmol/L        N             

 

             CARBON DIOXIDE (test code = CO2)  mmol/L      21-32                

      

 

             ANION GAP (test code = GAP)              10-20                     

 

 

             GLUCOSE (test code = GLU)  mg/dL                            

 

             BLOOD UREA NITROGEN (test code = BUN)  mg/dL       7-18            

           

 

             GLOMERULAR FILTRATION RATE (test code = GFR)  mL/min      >=60     

                  

 

             CREATININE (test code = CREAT)  mg/dL       0.7-1.3                

    

 

             BUN/CREATININE RATIO (test code = BUN/CREA)              10-20     

                 

 

             TOTAL PROTEIN (test code = PROT)  gram/dL     6.4-8.2              

      

 

             ALBUMIN (test code = ALB)  g/dL        3.4-5.0                    

 

             GLOBULIN (test code = GLOB)  gram/dL     2.7-4.2                   

 

 

             ALBUMIN/GLOBULIN RATIO (test code = A/G)              0.75-1.50    

              

 

             CALCIUM (test code = CA)  mg/dL       8.5-10.1                   

 

             BILIRUBIN TOTAL (test code = BILT)  mg/dL       0.0-1.0            

        

 

             SGOT/AST (test code = AST)  IUnit/L     15-37                      

 

             SGPT/ALT (test code = ALT)  IUnit/L     12-78                      

 

             ALKALINE PHOSPHATASE TOTAL (test code = ALKP)  IUnit/L       

                   





LIPID PROFILE (CORONARY RISK)2019 11:40:00* 



             Test Item    Value        Reference Range Interpretation Comments

 

             TRIGLYCERIDES (test code = TRIG)  mg/dL                      

      

 

             CHOLESTEROL (test code = CHOL)  mg/dL       0-200                  

    

 

             CHOLESTEROL/HDL RATIO (test code = CHOLHDL)  RATIO       0-4.9     

                 

 

             HDL CHOLESTEROL (test code = HDL)  mg/dL       40-60               

       

 

             LIPOPROTEIN LDL (test code = LDL)  mg/dL       100-129             

       





THYROID STIMULATING [id number] 11:40:00* 



             Test Item    Value        Reference Range Interpretation Comments

 

             THYROID STIMULATING HORMONE (test code = TSH)  uIU/mL      0.36-3.7

4                  





[id number] 11:06:00* 



             Test Item    Value        Reference Range Interpretation Comments

 

             GLYCOSYLATED HEMOGLOBIN (HA1C) (test code = GLYHGB) 10.8 % HbA1  4.

8-6.0      H             

 

             ESTIMATED AVERAGE GLUCOSE (test code = EAG) 263 MG/DL              

                 





CBC W/AUTO [id number] 10:48:00* 



             Test Item    Value        Reference Range Interpretation Comments

 

             WHITE BLOOD CELL (test code = WBC) 9.8 K/mm3    4.5-12.5     N     

        

 

             RED BLOOD CELL (test code = RBC) 5.46 mill/mm3 4.0-5.8      N      

       

 

             HEMOGLOBIN (test code = HGB) 16.1 gram/dL 13.0-17.5    N           

  

 

             HEMATOCRIT (test code = HCT) 48.5 %       42.0-52.0    N           

  

 

             MEAN CELL VOLUME (test code = MCV) 88.8 fL      80-98        N     

        

 

             MEAN CELL HGB (test code = MCH) 29.5 picogram 27.0-33.0    N       

      

 

             MEAN CELL HGB CONCETRATION (test code = MCHC) 33.2 gram/dL 33.0-36.

0    N             

 

             RED CELL DISTRIBUTION WIDTH (test code = RDW) 12.4 %       11.6-16.

2    N             

 

             RED CELL DISTRIBUTION WIDTH SD (test code = RDW-SD) 40.4 fL      37

.0-51.0    N             

 

             PLATELET COUNT (test code = PLT) 199 K/mm3    150-450      N       

      

 

             MEAN PLATELET VOLUME (test code = MPV) 11.7 fL      6.7-11.0     H 

            

 

             NEUTROPHIL % (test code = NT%) 77.0 %       39.0-69.0    H         

    

 

             IMMATURE GRANULOCYTE % (test code = IG%) 0.3 %        0.0-5.0      

N             

 

             LYMPHOCYTE % (test code = LY%) 12.2 %       25.0-55.0    L         

    

 

             MONOCYTE % (test code = MO%) 7.7 %        0.0-10.0     N           

  

 

             EOSINOPHIL % (test code = EO%) 2.0 %        0.0-5.0      N         

    

 

             BASOPHIL % (test code = BA%) 0.8 %        0.0-1.0      N           

  

 

             NUCLEATED RBC % (test code = NRBC%) 0.0 %        0-0          N    

         

 

             NEUTROPHIL # (test code = NT#) 7.51 K/mm3   1.8-7.7      N         

    

 

             IMMATURE GRANULOCYTE # (test code = IG#) 0.03 x10 3/uL 0-0.03      

 N             

 

             LYMPHOCYTE # (test code = LY#) 1.19 K/mm3   1.0-5.0      N         

    

 

             MONOCYTE # (test code = MO#) 0.75 K/mm3   0-0.8        N           

  

 

             EOSINOPHIL # (test code = EO#) 0.20 K/mm3   0.0-0.5      N         

    

 

             BASOPHIL # (test code = BA#) 0.08 K/mm3   0.0-0.2      N           

  

 

             NUCLEATED RBC # (test code = NRBC#) 0.00 K/mm3   0.0-0.1      N    

         

 

             MANUAL DIFF REQUIRED (test code = MDIFF) NO                        

              





- XR CHEST 2 -26-83 10:45:00 FAX: Adela Coello [phone]
    Raymond: O   St: PRE FAX: Shaneka Higgins [phone]   
------------------------------------------------------------------------------- 
 Name:   JUSTIN MACK           [facility]                     
: 1950  Age/S: 68/M           [address] [facility]                Unit #: 
[id number]      Loc: [place]  [address]              Phys: 
Shaneka Hazel MD                                               Acct: 
[id number] Dis Date:               Status: PRE SDC                            
    PHONE #: [phone]     Exam Date:     2019     1045                
   FAX #: [phone]     Reason: PRE OP                                       
      EXAMS:                                               CPT CODE:      
[id number] XR CHEST 2 V                               [id number]                    
HISTORY: Preop.               COMPARISON chest x-ray from 2015.       
        AP and lateral view of the chest:               No acute infiltrates, 
effusion or congestion. Lung scarring. Stent in       the subclavian vessel on 
the left. Cardiac silhouette is mildly       enlarged.                 
IMPRESSION:                   No acute infiltrates, effusion or congestion.     
     ** Electronically Signed by VINOD Patino on 2019 at 1045 **     
                 Reported and signed by: Serafin Patino M.D.                     
     CC: Adela Bañuelos MD; Shaneka Hazel MD                            
                                                                 Technologist: 
RT William(R)                                 Trnscrd Date/Time/By: 
2019 (1245) : By: [email or web address]           Orig Print D/T: S: 2019 (4068)
                         PAGE  1                       Signed Report            
                   CBC W/AUTO [id number] 10:44:00* 



             Test Item    Value        Reference Range Interpretation Comments

 

             WHITE BLOOD CELL (test code = WBC)  K/mm3       4.5-12.5           

        

 

             RED BLOOD CELL (test code = RBC)  mill/mm3    4.0-5.8              

      

 

             HEMOGLOBIN (test code = HGB) 16.1 gram/dL 13.0-17.5    N           

  

 

             HEMATOCRIT (test code = HCT) 48.5 %       42.0-52.0    N           

  

 

             MEAN CELL VOLUME (test code = MCV)  fL          80-98              

        

 

             MEAN CELL HGB (test code = MCH)  picogram    27.0-33.0             

     

 

             MEAN CELL HGB CONCETRATION (test code = MCHC)  gram/dL     33.0-36.

0                  

 

             RED CELL DISTRIBUTION WIDTH (test code = RDW)  %           11.6-16.

2                  

 

             RED CELL DISTRIBUTION WIDTH SD (test code = RDW-SD)  fL          37

.0-51.0                  

 

             PLATELET COUNT (test code = PLT)  K/mm3       150-450              

      

 

             MEAN PLATELET VOLUME (test code = MPV)  fL          6.7-11.0       

            

 

             NEUTROPHIL % (test code = NT%)  %           39.0-69.0              

    

 

             IMMATURE GRANULOCYTE % (test code = IG%)  %           0.0-5.0      

              

 

             LYMPHOCYTE % (test code = LY%)  %           25.0-55.0              

    

 

             MONOCYTE % (test code = MO%)  %           0.0-10.0                 

  

 

             EOSINOPHIL % (test code = EO%)  %           0.0-5.0                

    

 

             BASOPHIL % (test code = BA%)  %           0.0-1.0                  

  

 

             NEUTROPHIL # (test code = NT#)  K/mm3       1.8-7.7                

    

 

             LYMPHOCYTE # (test code = LY#)  K/mm3       1.0-5.0                

    

 

             MONOCYTE # (test code = MO#)  K/mm3       0-0.8                    

  

 

             EOSINOPHIL # (test code = EO#)  K/mm3       0.0-0.5                

    

 

             BASOPHIL # (test code = BA#)  K/mm3       0.0-0.2

## 2020-08-28 NOTE — XMS REPORT
Continuity of Care Document

                             Created on: 2020



JUSTIN MACK

External Reference #: [id number]

: 1950

Sex: Male



Demographics





                          Address                   [address] [place]  [address]

 

                          Home Phone                [phone]

 

                          Preferred Language        English

 

                          Marital Status            Unknown

 

                          Buddhism Affiliation     Unknown

 

                          Race                      Unknown

 

                                        Additional Race(s) 

 

 

                          Ethnic Group              Unknown





Author





                          Author                    [facility]

t

 

                          Organization              [facility]

 

                          Address                   [address]

[place]  [address]



 

                          Phone                     Unavailable







Support





                Name            Relationship    Address         Phone

 

                    JAYY WALKER PRS                 [address] [place], [place]  [address] [phone]

 

                    AARON  JAYY PRS                 [address] [place], [place]  [address] [phone]

 

                    ARTEMIO MACK PRS                 [address] [place], [place]  [address] [phone]







Care Team Providers





                    Care Team Member Name Role                Phone

 

                    DANISHKATHY, S KRISTI   Attphys             Unavailable







Payers





           Payer Name Policy Type Policy Number Effective Date Expiration Date S

ource







Problems





           Condition Name Condition Details Condition Category Status     Onset 

Date Resolution

Date            Last Treatment Date Treating Clinician Comments        Source

 

                          Carotid artery disease                            Rojas

tid artery disease           

            Active                                                Problem       
                2016                                                
Mohamed O Jeroudi                     Problem   Active                        20

20 02:48:23            

                                        Lu Richardson

 

                          Hypercholesterolemia                              Hype

rcholesterolemia               

        Active                                                Problem           
            2016                                                Mohamed O 
Jeroudi                     Problem Active                  2016 02:48:23 

                Lu Richardson

 

                          Diabetes mellitus                                 Diab

etes mellitus                     

  Active                                                Problem                 
      2016                                                Mohamed O 
Jeroudi                     Problem Active                  2016 02:48:23 

                Lu Richardson

 

                          Equivalent angina                                 Equi

valent angina                     

  Active                                                Problem                 
      2016                                                Mohamed O 
Jeroudi                     Problem Active                  2016 02:48:23 

                Lu Richardson

 

                          CAD, Graft                                        CAD,

 Graft                        Active     

                                          Problem                        
2016                                                Mohamed O Jeroudi     
               Problem Active                  2016 02:48:23              

   Lu Richardson

 

                          Smoker                                            Smok

er                        Active             

                                  Diagnosis                        2016   
                                            Mohamed O Jeroudi                   
        Diagnosis Active                  2016 02:48:23                 Me

adria Richardson

 

                          DJD (degenerative joint disease) of knee              

           DJD 

(degenerative joint disease) of knee                        Active              
                                 Diagnosis                        2016    
                                           Mohamed O Meekoudi                    
        Diagnosis Active                  2016 02:48:23                 Me

adria Richardson

 

                          Subclavian artery stenosis, left                      

   Subclavian artery 

stenosis, left                        Active                                    
           Problem                        2016                            
                   Shaneka Hazel                     Problem      Active    

                             

2016 02:48:23                                         [facility]

 

                          CVA                                               CVA 

                       Active                   

                            Problem                        2016           
                                    Shaneka Hazel                     

Problem   Active                        2016 02:48:23                     

[facility]

 

                                        Atherosclerosis of native arteries of th

e extremities with intermittent 

claudication                                                    Atherosclerosis 

of native arteries of the 

extremities with intermittent claudication                        Active        
                                       Problem                        2016
                                               Shaneka Hazel                
        Problem Active                  2016 02:48:23                 Pete Richardson

 

                          Abnormal EKG                                      Abno

rmal EKG                        Active 

                                              Problem                        
2016                                                Shaneka Hazel     
               Problem Active                  2016 02:48:23              

   [facility]

 

                          Benign hypertensive heart disease                     

    Benign hypertensive 

heart disease                        Active                                     
          Problem                        2016                             
                  Shaneka Hazel                     Problem      Active     

                            

2016 02:48:23                                         [facility]

 

                          Long term current use of insulin                      

   Long term current use 

of insulin                        Active                                        
       Diagnosis                        2020                              
                 Shaneka Hazel                     Diagnosis    Active      

                           

2020 02:45:23                                         [facility]

 

                          Patient unable to exercise                         Pat

ient unable to exercise   

                    Active                                                
Diagnosis                        2020                                     
          Shaneka Hazel                     Diagnosis    Active             

                    2020 

02:45:23                                                    [facility]

 

                          Subclavian arterial stenosis                         S

ubclavian arterial 

stenosis                        Active                                          
     Diagnosis                        2020                                
               Shaneka Hazel                     Diagnosis    Active        

                         

2020 02:45:23                                         [facility]

 

                          Type 2 diabetes mellitus with unspecified complication

s                         

Type 2 diabetes mellitus with unspecified complications                        
Active                                                Diagnosis                 
      2020                                                Shaneka Hazel                     Diagnosis Active                  2020 02:45:2

3                 

[facility]

 

                          History of CEA (carotid endarterectomy)               

          History of CEA 

(carotid endarterectomy)                        Active                          
                     Diagnosis                        2020                
                               Shaneka Hazel                     Diagnosis  

               

Active                           2020 02:45:23                       Thanh Richardson

 

                          Osteoarthritis of knee, unspecified                   

      Osteoarthritis of 

knee, unspecified                        Active                                 
              Diagnosis                        2020                       
                        Shaneka Hazel                     Diagnosis         

  Active               

                          2020 02:45:23                           [facility]

 

                          Nicotine dependence, unspecified, uncomplicated       

                  Nicotine

dependence, unspecified, uncomplicated                        Active            
                                   Diagnosis                        2020  
                                             Shaneka Hazel                  
        Diagnosis Active                  2020 02:45:23                 [facility]

 

                          Pure hypercholesterolemia, unspecified                

         Pure 

hypercholesterolemia, unspecified                        Active                 
                              Diagnosis                        2020       
                                        Shaneka Hazel                     

Diagnosis Active                        2020 02:45:23                     

[facility]

 

                          Hypertensive heart disease without heart failure      

                   

Hypertensive heart disease without heart failure                        Active  
                                             Diagnosis                        
2020                                                Shaneka Hazel     
               Diagnosis Active                  2020 02:45:23            

     [facility]

 

                                        Atherosclerosis of coronary artery bypas

s graft of native heart with angina 

pectoris                                                        Atherosclerosis 

of coronary artery bypass graft

of native heart with angina pectoris                        Active              
                                 Problem                        2020      
                                         Shaneka Hazel                     

Problem   Active                        2020 02:45:23                     

[facility]

 

                          Abnormal electrocardiogram [ECG] [EKG]                

         Abnormal 

electrocardiogram [ECG] [EKG]                        Active                     
                           Diagnosis                        2020          
                                      Shaneka Hazel                     

Diagnosis Active                        2020 02:45:23                     

[facility]

 

                          Occlusion and stenosis of bilateral carotid arteries  

                       

Occlusion and stenosis of bilateral carotid arteries                        
Active                                                Diagnosis                 
       2020                                                Shaneka Hazel                     Diagnosis Active                  2020 02:45:2

3                 

[facility]

 

                          Acute mitral insufficiency                         Acu

te mitral insufficiency   

                     Active                                                
Problem                        2020                                       
         Shaneka Hazel                     Problem      Active              

                   2020 

02:45:23                                                    [facility]

 

                          History of MI (myocardial infarction)                 

        History of MI 

(myocardial infarction)                        Active                           
                     Problem                        2020                  
                              Shaneka Hazel                     Problem     

              

Active                           2020 02:45:23                       [facility]

 

                          Mitral valve disorders                            Mitr

al valve disorders           

             Active                                                Problem      
                  2020                                                
Shaneka Hazel                     Problem   Active                        07 02:45:23            

                                        [facility]

 

                                        Atherosclerosis of native artery of both

 lower extremities with intermittent 

claudication                                                    Atherosclerosis 

of native artery of both 

lower extremities with intermittent claudication                        Active  
                                              Diagnosis                        
2020                                                Shaneka Hazel     
                Diagnosis Active                  2020 02:45:23           

      [facility]

 

                          THOMPSON (dyspnea on exertion)                         THOMPSON 

(dyspnea on exertion)     

                   Active                                                Problem
                        2020                                              
  Shaneka Hazel                     Problem    Active                       

    2020 02:45:23  

                                                    [facility]

 

                          Chronic systolic congestive heart failure             

            Chronic 

systolic congestive heart failure                        Active                 
                               Problem                        2020        
                                        Shaneka Hazel                     

Problem   Active                        2020 02:45:23                     

[facility]

 

                          Abnormal cardiovascular stress test                   

      Abnormal 

cardiovascular stress test                        Active                        
                        Problem                        2020               
                                 Mohamed O Jeroudi                     Problem  

                 

Active                           2020 02:45:23                       [facility]

 

                          Other forms of angina pectoris                        

 Other forms of angina 

pectoris                        Active                                          
      Problem                        2020                                 
               Shaneka CHRISTENSEN Jeroudi                     Problem      Active        

                         

2020 02:45:23                                         [facility]







Allergies, Adverse Reactions, Alerts





        Allergy Name Allergy Type Status  Severity Reaction(s) Onset Date Inacti

ve Date 

Treating Clinician        Comments                  Source

 

       No Known Allergies DA     Active U             2020 00:00:00       

               [facility]

 

       N.KFLAVIO MEDINA Active        Info Not Available 2019 00:00:00   

                   [facility]

 

       No Known Allergies DA     Active U             2015 00:00:00       

               [facility]







Family History





           Family Member Diagnosis  Comments   Start Date Stop Date  Source

 

           Unknown Family Member Family History            2016 02:48:12 2

 02:48:12 

[facility]







Social History





           Social Habit Start Date Stop Date  Quantity   Comments   Source

 

           Smoking    2015 00:00:00 2015 00:00:00                   

    [facility]







Medications





             Ordered Medication Name Filled Medication Name Start Date   Stop Da

te    Current 

Medication? Ordering Clinician Indication Dosage     Frequency  Signature (SIG) 

Comments                  Components                Source

 

      Clopidogrel Bisulfate       2020 02:45:23       Yes   Ahmad Jeroudi 

                  1 tablet  

                                                    [facility]

 

     Aspirin EC      2020 02:45:23      Yes  Ahmad Jeroudi                

1 tablet           [facility]

 

       Glimepiride        2020 02:45:23        Yes    Ahmad Jeroudi       

               1 tablet with 

breakfast or the first main meal of the day                                     

    [facility]

 

       Metoprolol Tartrate        2020 02:45:23        Yes    Ahmad Jeroud

i                      1/2 half 

tablet                                                      [facility]

 

     Levemir      2020 02:45:23      Yes  Ahmad Jeroudi                as 

directed           [facility]

 

      Atorvastatin Calcium       2020 02:45:23       Yes   Ahmad Jeroudi  

                 1 tablet       

                                        [facility]

 

     Lisinopril      2020 02:45:23      Yes  Ahmad Jeroudi                

1 tablet           [facility]

 

     Aspir-81      2020 02:45:23      Yes  Ahmad Jeroudi                1 

tablet           [facility]

 

     Lasix      2020 00:00:00      Yes  Ahmad Jeroudi                1 tab

let           [facility]

 

     Levemir      2016 02:48:23      Yes  Shaneka Edentadi                a

s directed           

[facility] [place]

 

       Glimepiride        2016 02:48:23        Yes    Shaneka Edenoudi     

                 1 tablet with 

breakfast or the first main meal of the day                                     

    [facility] [place]

 

       Metoprolol Tartrate        2016 02:48:23        Yes    Shaneka Xiong

charlotte                      1/2 half 

tablet                                                      [facility]

 

       Clopidogrel Bisulfate        2016 02:48:23        Yes    Shaneka Forrester

roudi                      1 tablet

                                                            [facility]

 

     Aspirin EC      2016 02:48:23      Yes  Shaneka Edenoudi              

  1 tablet           

[facility]

 

      Atorvastatin Calcium       2016 02:48:23       Yes   Shaneka Edenoudi

                   1 tablet 

                                                    [facility] [place]

 

     Lisinopril      2016 02:48:23      Yes  Shaneka Edenoudi              

  1 tablet           

[facility]







Vital Signs





             Vital Name   Observation Time Observation Value Comments     Source

 

             Weight       2020 18:00:00                           [facility]

 [place]

 

             [facility]       2020 18:00:00                           [facility]

 

             Temperature Oral (F) 2020 18:00:00 97.0 F                    

[facility]

 

             Heart Rate   2020 18:00:00                           [facility]

 [place]

 

             Diastolic (mm Hg) 2020 18:00:00                           [facility] [place]

 

             Systolic (mm Hg) 2020 18:00:00                           [facility]

 

             Weight       2019 16:00:00                           [facility]

 [place]

 

             Height       2019 16:00:00                           [facility]

 [place]

 

             Temperature Oral (F) 2019 16:00:00 97.0 F                    

[facility] [place]

 

             Heart Rate   2019 16:00:00                           [facility]

 [place]

 

             Diastolic (mm Hg) 2019 16:00:00                           [facility] [place]

 

             Systolic (mm Hg) 2019 16:00:00                           [facility]

razial [place]

 

             Weight       2019 18:00:00                           [facility]

 [place]

 

             Height       2019 18:00:00                           [facility]

 [place]

 

             Temperature Oral (F) 2019 18:00:00 96.7 F                    

[facility] [place]

 

             Heart Rate   2019 18:00:00                           [facility]

 [place]

 

             Diastolic (mm Hg) 2019 18:00:00                           [facility] [place]

 

             Systolic (mm Hg) 2019 18:00:00                           [facility]

[facility] [place]

 

             Weight       2019 19:00:00                           [facility]

 [place]

 

             Height       2019 19:00:00                           [facility]

 [place]

 

             Temperature Oral (F) 2019 19:00:00 96.2 F                    

[facility]

 

             Heart Rate   2019 19:00:00                           [facility]

 [place]

 

             Diastolic (mm Hg) 2019 19:00:00                           [facility]

[facility] [place]

 

             Systolic (mm Hg) 2019 19:00:00                           [facility]

[facility] [place]

 

             Weight       2019 20:30:00                           [facility]

 [place]

 

             Height       2019 20:30:00                           [facility]

 [place]

 

             Temperature Oral (F) 2019 20:30:00 96.1 F                    

[facility] [place]

 

             Heart Rate   2019 20:30:00                           [facility]

 [place]

 

             Diastolic (mm Hg) 2019 20:30:00                           [facility]

[facility] [place]

 

             Systolic (mm Hg) 2019 20:30:00                           [facility]

[facility] [place]

 

             Weight       2015 20:00:00                           [facility]

 [place]

 

             Height       2015 20:00:00                           [facility]

 [place]

 

             Temperature Oral (F) 2015 20:00:00 97.9 F                    

[facility] [place]

 

             Heart Rate   2015 20:00:00                           [facility]

 [place]

 

             Diastolic (mm Hg) 2015 20:00:00                           [facility]

[facility] [place]

 

             Systolic (mm Hg) 2015 20:00:00                           [facility]

[facility]

 

             [facility]       2015 19:00:00                           [facility]

 

             Height       2015 19:00:00                           [facility]

 [facility]

 

             Temperature Oral (F) 2015 19:00:00 97.2 F                    

[facility]

 

             Heart Rate   2015 19:00:00                           [facility]

 [place]

 

             Diastolic (mm Hg) 2015 19:00:00                           [facility]

[facility]

 

             Systolic (mm Hg) 2015 19:00:00                           [facility]

[facility] [place]

 

             Weight       2015 19:30:00                           [facility]

 [place]

 

             Height       2015 19:30:00                           [facility]

 [place]

 

             Temperature Oral (F) 2015 19:30:00 96.7 F                    

[facility]

 

             Heart Rate   2015 19:30:00                           [facility]

 [place]

 

             Diastolic (mm Hg) 2015 19:30:00                           [facility]

[facility] [place]

 

             Systolic (mm Hg) 2015 19:30:00                           [facility]

[facility]







Procedures

This patient has no known procedures.



Encounters





             Start Date/Time End Date/Time Encounter Type Admission Type [facility]   Care Department Encounter ID    Source

 

          2020 13:00:00 2020 13:00:00 Outpatient                    

 Shaneka Hazel MD PA   [id number]                    [facility]

 

          2019 11:00:00 2019 11:00:00 Outpatient                    

 Shaneka Hazel MD PA   [id number]                    [facility]

 

          2019 13:00:00 2019 13:00:00 Outpatient                    

 Shaneka Hazel MD PA   [id number]                    [facility]

 

          2019 14:00:00 2019 14:00:00 Outpatient                    

 Shaneka Hazel MD PA   [id number]                    [facility]

 

          2019 15:30:00 2019 15:30:00 Outpatient                    

 Shaneka Hazel MD PA   [id number]                    [facility]

 

           2015 15:00:00 2015 15:00:00 Outpatient                   

    [place] [id number]               [facility]

 

           2015 14:00:00 2015 14:00:00 Outpatient                   

    [place] [id number]               [facility]

 

           2015 14:30:00 2015 14:30:00 Outpatient                   

    [place] [id number]               [facility]







Results





           Test Description Test Time  Test Comments Results    Result Comments 

Source

 

                CT ABDOMEN/PELVIS W 2020 14:13:00                         

                              

                                                [place] [address]      Patient Name: JUSTIN MACK                                
MR #: [id number]                  : 1950                               
    Age/Sex: 69/M  [id number]                              Req #: [id number]
[id number]  [facility] Physician:                                                      
Ordered by: KRISTI JAIMES DO                         Report #: [id number]    
    Location: ER                                      Room/Bed:                 
  
________________________________________________________________________________

___________________    Procedure: [id number] CT/CT ABDOMEN/PELVIS W  Exam Date: 
20                            Exam Time: 1330                             
                 REPORT STATUS: Signed    CT of the abdomen and pelvis, with 
contrast.          History: Right upper quadrant abdominal pain.      
Comparison: None available.      Technique: Multidetector CT scanning of the 
abdomen and pelvis was performed   from the level of the lung bases to the 
inferior pubic rami after intravenous   administration of contrast.  Coronal and
 sagittal multiplanar reformations were   obtained.      RADIATION DOSE:        
Total DLP: 751.24 mGy*cm        Dose modulation, iterative reconstruction, 
and/or weight based adjustment   of the mA/kV was utilized to reduce the 
radiation dose to as low as reasonably   achievable.       FINDINGS:   Areas of 
scattered subsegmental atelectasis noted within the visualized lung   bases. 
Atherosclerotic calcifications noted within the visualized coronary   arteries. 
     There is a moderate volume of simple-appearing abdominopelvic ascites 
present.      The liver is normal in size and attenuation but demonstrates a 
subtle   micronodular contour which can be seen in setting of hepatic 
dysfunction. No   focal hepatic abnormality is identified on the single phase 
examination. There   is reflux of contrast material into the hepatic veins which
 is nonspecific but   can be seen in the setting of right-sided heart 
dysfunction. The gallbladder is   not dilated. There is no evidence for 
radiopaque stone or wall thickening.   There is no intra or extrahepatic biliary
 ductal dilatation. The stomach,   spleen, pancreas, and bilateral adrenal 
glands are unremarkable.      The kidneys are normal in size and location and 
enhance symmetrically. Vascular   calcifications are noted on the left. There is
 no evidence for nephrolithiasis   or hydronephrosis. No ureteral stone or 
dilatation is appreciated.       The abdominal aorta is normal in course and 
caliber with extensive   atherosclerotic calcifications. Significant 
atherosclerotic plaquing noted at   the origins of the celiac axis, SMA, left 
renal artery, and MERCEDES. The IVC is   unremarkable. The portal venous system, SMV,
 and splenic vein appear patent.      Please note evaluation of bowel is limited
 without the use of enteric contrast   material. Mild wall thickening noted of a
 loop of small bowel within the left   upper abdomen, likely reactive to 
adjacent ascites. The visualized loops of   small and large bowel otherwise 
demonstrate no evidence of obstruction or   inflammation. There is no 
intraperitoneal free air. No abnormally enlarged   lymph nodes are identified 
within the abdomen or pelvis. Tiny fat-containing   umbilical hernia and small 
bilateral fat-containing hernias noted.      There are degenerative changes of 
the lower lumbar spine. Osseous structures   otherwise demonstrate no evidence 
for acute fracture or destructive process.   Body wall edema noted.         
IMPRESSION:      1. Moderate volume of abdominopelvic ascites.      2. Subtle 
micronodular contour noted of the liver which can be seen in the   setting of 
hepatic dysfunction. Reflux of contrast material also noted within   the hepatic
 veins which is nonspecific but can be seen in the setting of   right-sided 
heart dysfunction.      3. Prominent calcific abdominal aortic atherosclerosis 
and coronary artery   disease.      Signed by: Dr. Morro Benavidez MD on 2020
 2:30 PM        Dictated By: MORRO BENAVIDEZ MD  Electronically Signed By: MORRO BENAVIDEZ MD on 20 1430  Transcribed By: PEG on 20 1430       COPY
 TO:   KRISTI JAIMES DO                                     

 

                    BASIC METABOLIC PANEL 2020 08:17:00   

 

                                        Test Item

 

             SODIUM (test code = NA) 138 mmol/L   136-145      N             

 

             POTASSIUM (test code = K) 3.8 mmol/L   3.5-5.1      N             

 

             CHLORIDE (test code = CL) 92.0 mmol/L         L             

 

             CARBON DIOXIDE (test code = CO2) 39.0 mmol/L  21-32        H       

      

 

             ANION GAP (test code = GAP) 10.8         10-20        N            

 

 

             GLUCOSE (test code = GLU) 63 mg/dL            L             

 

             BLOOD UREA NITROGEN (test code = BUN) 29 mg/dL     7-18         H  

           

 

             GLOMERULAR FILTRATION RATE (test code = GFR) 46 mL/min    >=60     

                 Estimated GFR by 

using Modified MDRD formula.Chronic kidney disease is defined as either kidney 
damageor GFR <60 mL/min/1.73 m2 for >3 months.

 

             CREATININE (test code = CREAT) 1.50 mg/dL   0.7-1.3      H         

    

 

             BUN/CREATININE RATIO (test code = BUN/CREA) 19.2         10-20     

   N             

 

             CALCIUM (test code = CA) 9.7 mg/dL    8.5-10.1     N             





BASIC METABOLIC [id number] 08:09:00* 



             Test Item    Value        Reference Range Interpretation Comments

 

             SODIUM (test code = NA) 138 mmol/L   136-145      N             

 

             POTASSIUM (test code = K) 3.8 mmol/L   3.5-5.1      N             

 

             CHLORIDE (test code = CL) 92.0 mmol/L         L             

 

             CARBON DIOXIDE (test code = CO2)  mmol/L      21-32                

      

 

             ANION GAP (test code = GAP) 10.8         10-20        N            

 

 

             GLUCOSE (test code = GLU)  mg/dL                            

 

             BLOOD UREA NITROGEN (test code = BUN)  mg/dL       7-18            

           

 

             GLOMERULAR FILTRATION RATE (test code = GFR)  mL/min      >=60     

                  

 

             CREATININE (test code = CREAT)  mg/dL       0.7-1.3                

    

 

             BUN/CREATININE RATIO (test code = BUN/CREA)              10-20     

                 

 

             CALCIUM (test code = CA) 9.7 mg/dL    8.5-10.1     N             





[id number] 07:58:00* 



             Test Item    Value        Reference Range Interpretation Comments

 

             GLUBED (test code = GLUBED) 121 mg/dL           H            

Performed by certified  

at [facility]





[id number] 06:31:00* 



             Test Item    Value        Reference Range Interpretation Comments

 

             GLUBED (test code = GLUBED) 65 mg/dL            L            

Performed by certified  at

 [facility]





COMPREHENSIVE METABOLIC [id number] 05:10:00* 



             Test Item    Value        Reference Range Interpretation Comments

 

             SODIUM (test code = NA) 137 mmol/L   136-145      N             

 

             POTASSIUM (test code = K) 3.6 mmol/L   3.5-5.1      N             

 

             CHLORIDE (test code = CL) 92.0 mmol/L         L             

 

             CARBON DIOXIDE (test code = CO2) 39.0 mmol/L  21-32        H       

      

 

             ANION GAP (test code = GAP) 9.6          10-20        L            

 

 

             GLUCOSE (test code = GLU) 63 mg/dL            L             

 

             BLOOD UREA NITROGEN (test code = BUN) 29 mg/dL     7-18         H  

           

 

             GLOMERULAR FILTRATION RATE (test code = GFR) 46 mL/min    >=60     

                 Estimated GFR by 

using Modified MDRD formula.Chronic kidney disease is defined as either kidney 
damageor GFR <60 mL/min/1.73 m2 for >3 months.

 

             CREATININE (test code = CREAT) 1.50 mg/dL   0.7-1.3      H         

    

 

             BUN/CREATININE RATIO (test code = BUN/CREA) 19.6         10-20     

   N             

 

             TOTAL PROTEIN (test code = PROT) 7.2 gram/dL  6.4-8.2      N       

      

 

             ALBUMIN (test code = ALB) 3.0 g/dL     3.4-5.0      L             

 

             GLOBULIN (test code = GLOB) 4.2 gram/dL  2.7-4.2      N            

 

 

             ALBUMIN/GLOBULIN RATIO (test code = A/G) 0.7          0.75-1.50    

L             

 

             CALCIUM (test code = CA) 9.6 mg/dL    8.5-10.1     N             

 

             BILIRUBIN TOTAL (test code = BILT) 1.10 mg/dL   0.0-1.0      H     

        

 

             SGOT/AST (test code = AST) 23 IUnit/L   15-37        N             

 

             SGPT/ALT (test code = ALT) 26 IUnit/L   12-78        N             

 

             ALKALINE PHOSPHATASE TOTAL (test code = ALKP) 73 IUnit/L     

     N            **Note change 

in reference range due to change in reagent.**





COMPREHENSIVE METABOLIC [id number] 05:01:00* 



             Test Item    Value        Reference Range Interpretation Comments

 

             SODIUM (test code = NA) 137 mmol/L   136-145      N             

 

             POTASSIUM (test code = K) 3.6 mmol/L   3.5-5.1      N             

 

             CHLORIDE (test code = CL) 92.0 mmol/L         L             

 

             CARBON DIOXIDE (test code = CO2)  mmol/L      21-32                

      

 

             ANION GAP (test code = GAP)              10-20                     

 

 

             GLUCOSE (test code = GLU)  mg/dL                            

 

             BLOOD UREA NITROGEN (test code = BUN)  mg/dL       7-18            

           

 

             GLOMERULAR FILTRATION RATE (test code = GFR)  mL/min      >=60     

                  

 

             CREATININE (test code = CREAT)  mg/dL       0.7-1.3                

    

 

             BUN/CREATININE RATIO (test code = BUN/CREA)              10-20     

                 

 

             TOTAL PROTEIN (test code = PROT)  gram/dL     6.4-8.2              

      

 

             ALBUMIN (test code = ALB)  g/dL        3.4-5.0                    

 

             GLOBULIN (test code = GLOB)  gram/dL     2.7-4.2                   

 

 

             ALBUMIN/GLOBULIN RATIO (test code = A/G)              0.75-1.50    

              

 

             CALCIUM (test code = CA)  mg/dL       8.5-10.1                   

 

             BILIRUBIN TOTAL (test code = BILT)  mg/dL       0.0-1.0            

        

 

             SGOT/AST (test code = AST)  IUnit/L     15-37                      

 

             SGPT/ALT (test code = ALT)  IUnit/L     12-78                      

 

             ALKALINE PHOSPHATASE TOTAL (test code = ALKP)  IUnit/L       

                   





CBC W/AUTO [id number] 04:40:00* 



             Test Item    Value        Reference Range Interpretation Comments

 

             WHITE BLOOD CELL (test code = WBC) 7.2 K/mm3    4.5-12.5     N     

        

 

             RED BLOOD CELL (test code = RBC) 4.80 mill/mm3 4.0-5.8      N      

       

 

             HEMOGLOBIN (test code = HGB) 12.8 gram/dL 13.0-17.5    L           

  

 

             HEMATOCRIT (test code = HCT) 39.7 %       42.0-52.0    L           

  

 

             MEAN CELL VOLUME (test code = MCV) 82.7 fL      80-98        N     

        

 

             MEAN CELL HGB (test code = MCH) 26.7 picogram 27.0-33.0    L       

      

 

             MEAN CELL HGB CONCETRATION (test code = MCHC) 32.2 gram/dL 33.0-36.

0    L             

 

             RED CELL DISTRIBUTION WIDTH (test code = RDW) 15.7 %       11.6-16.

2    N             

 

             RED CELL DISTRIBUTION WIDTH SD (test code = RDW-SD) 47.4 fL      37

.0-51.0    N             

 

             PLATELET COUNT (test code = PLT) 284 K/mm3    150-450      N       

      

 

             MEAN PLATELET VOLUME (test code = MPV) 10.7 fL      6.7-11.0     N 

            

 

             NEUTROPHIL % (test code = NT%) 64.6 %       39.0-69.0    N         

    

 

             IMMATURE GRANULOCYTE % (test code = IG%) 0.1 %        0.0-5.0      

N             

 

             LYMPHOCYTE % (test code = LY%) 14.6 %       25.0-55.0    L         

    

 

             MONOCYTE % (test code = MO%) 11.3 %       0.0-10.0     H           

  

 

             EOSINOPHIL % (test code = EO%) 7.9 %        0.0-5.0      H         

    

 

             BASOPHIL % (test code = BA%) 1.5 %        0.0-1.0      H           

  

 

             NUCLEATED RBC % (test code = NRBC%) 0.0 %        0-0          N    

         

 

             NEUTROPHIL # (test code = NT#) 4.65 K/mm3   1.8-7.7      N         

    

 

             IMMATURE GRANULOCYTE # (test code = IG#) 0.01 x10 3/uL 0-0.03      

 N             

 

             LYMPHOCYTE # (test code = LY#) 1.05 K/mm3   1.0-5.0      N         

    

 

             MONOCYTE # (test code = MO#) 0.81 K/mm3   0-0.8        H           

  

 

             EOSINOPHIL # (test code = EO#) 0.57 K/mm3   0.0-0.5      H         

    

 

             BASOPHIL # (test code = BA#) 0.11 K/mm3   0.0-0.2      N           

  

 

             NUCLEATED RBC # (test code = NRBC#) 0.00 K/mm3   0.0-0.1      N    

         





[id number] 20:15:00* 



             Test Item    Value        Reference Range Interpretation Comments

 

             GLUBED (test code = GLUBED) 159 mg/dL           H            

Performed by certified  

at [facility]





[id number] 17:32:00* 



             Test Item    Value        Reference Range Interpretation Comments

 

             GLUBED (test code = GLUBED) 120 mg/dL           H            

Performed by certified  

at [facility]





[id number] 12:43:00* 



             Test Item    Value        Reference Range Interpretation Comments

 

             GLUBED (test code = GLUBED) 171 mg/dL           H            

Performed by certified  

at [facility]





[id number] 08:29:00* 



             Test Item    Value        Reference Range Interpretation Comments

 

             GLUBED (test code = GLUBED) 85 mg/dL            N            

Performed by certified  at

 [facility]





COMPREHENSIVE METABOLIC [id number] 05:57:00* 



             Test Item    Value        Reference Range Interpretation Comments

 

             SODIUM (test code = NA) 137 mmol/L   136-145      N             

 

             POTASSIUM (test code = K) 4.0 mmol/L   3.5-5.1      N             

 

             CHLORIDE (test code = CL) 89.0 mmol/L         L             

 

             CARBON DIOXIDE (test code = CO2) 42.0 mmol/L  21-32        H       

      

 

             ANION GAP (test code = GAP) 10.0         10-20        N            

 

 

             GLUCOSE (test code = GLU) 76 mg/dL            N             

 

             BLOOD UREA NITROGEN (test code = BUN) 27 mg/dL     7-18         H  

          RESULT VERIFIED BY REPEAT 

ANALYSIS

 

             GLOMERULAR FILTRATION RATE (test code = GFR) 40 mL/min    >=60     

                 Estimated GFR by 

using Modified MDRD formula.Chronic kidney disease is defined as either kidney 
damageor GFR <60 mL/min/1.73 m2 for >3 months.

 

             CREATININE (test code = CREAT) 1.70 mg/dL   0.7-1.3      H         

    

 

             BUN/CREATININE RATIO (test code = BUN/CREA) 15.9         10-20     

   N             

 

             TOTAL PROTEIN (test code = PROT) 8.1 gram/dL  6.4-8.2      N       

      

 

             ALBUMIN (test code = ALB) 3.3 g/dL     3.4-5.0      L             

 

             GLOBULIN (test code = GLOB) 4.8 gram/dL  2.7-4.2      H            

 

 

             ALBUMIN/GLOBULIN RATIO (test code = A/G) 0.7          0.75-1.50    

L             

 

             CALCIUM (test code = CA) 10.2 mg/dL   8.5-10.1     H             

 

             BILIRUBIN TOTAL (test code = BILT) 1.20 mg/dL   0.0-1.0      H     

        

 

             SGOT/AST (test code = AST) 22 IUnit/L   15-37        N             

 

             SGPT/ALT (test code = ALT) 29 IUnit/L   12-78        N             

 

             ALKALINE PHOSPHATASE TOTAL (test code = ALKP) 83 IUnit/L     

     N            **Note change 

in reference range due to change in reagent.**





COMPREHENSIVE METABOLIC [id number] 04:43:00* 



             Test Item    Value        Reference Range Interpretation Comments

 

             SODIUM (test code = NA) 137 mmol/L   136-145      N             

 

             POTASSIUM (test code = K) 4.0 mmol/L   3.5-5.1      N             

 

             CHLORIDE (test code = CL) 89.0 mmol/L         L             

 

             CARBON DIOXIDE (test code = CO2)  mmol/L      21-32                

      

 

             ANION GAP (test code = GAP)              10-20                     

 

 

             GLUCOSE (test code = GLU)  mg/dL                            

 

             BLOOD UREA NITROGEN (test code = BUN)  mg/dL       7-18            

           

 

             GLOMERULAR FILTRATION RATE (test code = GFR)  mL/min      >=60     

                  

 

             CREATININE (test code = CREAT)  mg/dL       0.7-1.3                

    

 

             BUN/CREATININE RATIO (test code = BUN/CREA)              10-20     

                 

 

             TOTAL PROTEIN (test code = PROT)  gram/dL     6.4-8.2              

      

 

             ALBUMIN (test code = ALB)  g/dL        3.4-5.0                    

 

             GLOBULIN (test code = GLOB)  gram/dL     2.7-4.2                   

 

 

             ALBUMIN/GLOBULIN RATIO (test code = A/G)              0.75-1.50    

              

 

             CALCIUM (test code = CA)  mg/dL       8.5-10.1                   

 

             BILIRUBIN TOTAL (test code = BILT)  mg/dL       0.0-1.0            

        

 

             SGOT/AST (test code = AST)  IUnit/L     15-37                      

 

             SGPT/ALT (test code = ALT)  IUnit/L     12-78                      

 

             ALKALINE PHOSPHATASE TOTAL (test code = ALKP)  IUnit/L       

                   





[id number] 19:59:00* 



             Test Item    Value        Reference Range Interpretation Comments

 

             GLUBED (test code = GLUBED) 162 mg/dL           H            

Performed by certified  

at [facility]





[id number] 17:02:00* 



             Test Item    Value        Reference Range Interpretation Comments

 

             GLUBED (test code = GLUBED) 168 mg/dL           H            

Performed by certified  

at [facility]





[id number] 12:11:00* 



             Test Item    Value        Reference Range Interpretation Comments

 

             GLUBED (test code = GLUBED) 120 mg/dL           H            

Performed by certified  

at [facility]





[id number] 07:43:00* 



             Test Item    Value        Reference Range Interpretation Comments

 

             GLUBED (test code = GLUBED) 85 mg/dL            N            

Performed by certified  at

 [facility]





COMPREHENSIVE METABOLIC [id number] 05:25:00* 



             Test Item    Value        Reference Range Interpretation Comments

 

             SODIUM (test code = NA) 138 mmol/L   136-145      N             

 

             POTASSIUM (test code = K) 3.4 mmol/L   3.5-5.1      L             

 

             CHLORIDE (test code = CL) 92.0 mmol/L         L             

 

             CARBON DIOXIDE (test code = CO2) 38.0 mmol/L  21-32        H       

      

 

             ANION GAP (test code = GAP) 11.4         10-20        N            

 

 

             GLUCOSE (test code = GLU) 81 mg/dL            N             

 

             BLOOD UREA NITROGEN (test code = BUN) 20 mg/dL     7-18         H  

           

 

             GLOMERULAR FILTRATION RATE (test code = GFR) 60 mL/min    >=60     

                 Estimated GFR by 

using Modified MDRD formula.Chronic kidney disease is defined as either kidney 
damageor GFR <60 mL/min/1.73 m2 for >3 months.

 

             CREATININE (test code = CREAT) 1.20 mg/dL   0.7-1.3      N         

    

 

             BUN/CREATININE RATIO (test code = BUN/CREA) 16.0         10-20     

   N             

 

             TOTAL PROTEIN (test code = PROT) 6.8 gram/dL  6.4-8.2      N       

      

 

             ALBUMIN (test code = ALB) 2.7 g/dL     3.4-5.0      L             

 

             GLOBULIN (test code = GLOB) 4.1 gram/dL  2.7-4.2      N            

 

 

             ALBUMIN/GLOBULIN RATIO (test code = A/G) 0.7          0.75-1.50    

L             

 

             CALCIUM (test code = CA) 9.7 mg/dL    8.5-10.1     N             

 

             BILIRUBIN TOTAL (test code = BILT) 1.10 mg/dL   0.0-1.0      H     

        

 

             SGOT/AST (test code = AST) 21 IUnit/L   15-37        N             

 

             SGPT/ALT (test code = ALT) 23 IUnit/L   12-78        N             

 

             ALKALINE PHOSPHATASE TOTAL (test code = ALKP) 70 IUnit/L     

     N            **Note change 

in reference range due to change in reagent.**





COMPREHENSIVE METABOLIC [id number] 05:09:00* 



             Test Item    Value        Reference Range Interpretation Comments

 

             SODIUM (test code = NA) 138 mmol/L   136-145      N             

 

             POTASSIUM (test code = K) 3.4 mmol/L   3.5-5.1      L             

 

             CHLORIDE (test code = CL) 92.0 mmol/L         L             

 

             CARBON DIOXIDE (test code = CO2)  mmol/L      21-32                

      

 

             ANION GAP (test code = GAP)              10-20                     

 

 

             GLUCOSE (test code = GLU)  mg/dL                            

 

             BLOOD UREA NITROGEN (test code = BUN)  mg/dL       7-18            

           

 

             GLOMERULAR FILTRATION RATE (test code = GFR)  mL/min      >=60     

                  

 

             CREATININE (test code = CREAT)  mg/dL       0.7-1.3                

    

 

             BUN/CREATININE RATIO (test code = BUN/CREA)              10-20     

                 

 

             TOTAL PROTEIN (test code = PROT)  gram/dL     6.4-8.2              

      

 

             ALBUMIN (test code = ALB)  g/dL        3.4-5.0                    

 

             GLOBULIN (test code = GLOB)  gram/dL     2.7-4.2                   

 

 

             ALBUMIN/GLOBULIN RATIO (test code = A/G)              0.75-1.50    

              

 

             CALCIUM (test code = CA)  mg/dL       8.5-10.1                   

 

             BILIRUBIN TOTAL (test code = BILT)  mg/dL       0.0-1.0            

        

 

             SGOT/AST (test code = AST)  IUnit/L     15-37                      

 

             SGPT/ALT (test code = ALT)  IUnit/L     12-78                      

 

             ALKALINE PHOSPHATASE TOTAL (test code = ALKP)  IUnit/L       

                   





[id number] 19:59:00* 



             Test Item    Value        Reference Range Interpretation Comments

 

             GLUBED (test code = GLUBED) 209 mg/dL           H            

Performed by certified  

at [facility]





[id number] 16:00:00* 



             Test Item    Value        Reference Range Interpretation Comments

 

             GLUBED (test code = GLUBED) 150 mg/dL           H            

Performed by certified  

at [facility]





[id number] 11:53:00* 



             Test Item    Value        Reference Range Interpretation Comments

 

             GLUBED (test code = GLUBED) 115 mg/dL           H            

Performed by certified  

at [facility]





[id number] 07:47:00* 



             Test Item    Value        Reference Range Interpretation Comments

 

             GLUBED (test code = GLUBED) 77 mg/dL            N            

Performed by certified  at

 [facility]





COMPREHENSIVE METABOLIC [id number] 07:05:00* 



             Test Item    Value        Reference Range Interpretation Comments

 

             SODIUM (test code = NA) 138 mmol/L   136-145      N             

 

             POTASSIUM (test code = K) 3.6 mmol/L   3.5-5.1      N             

 

             CHLORIDE (test code = CL) 97.0 mmol/L         L             

 

             CARBON DIOXIDE (test code = CO2) 34.0 mmol/L  21-32        H       

      

 

             ANION GAP (test code = GAP) 10.6         10-20        N            

 

 

             GLUCOSE (test code = GLU) 66 mg/dL            L             

 

             BLOOD UREA NITROGEN (test code = BUN) 18 mg/dL     7-18         N  

           

 

             GLOMERULAR FILTRATION RATE (test code = GFR) > 60 mL/min  >=60     

                 Estimated GFR by

 using Modified MDRD formula.Chronic kidney disease is defined as either kidney 
damageor GFR <60 mL/min/1.73 m2 for >3 months.

 

             CREATININE (test code = CREAT) 1.10 mg/dL   0.7-1.3      N         

    

 

             BUN/CREATININE RATIO (test code = BUN/CREA) 16.1         10-20     

   N             

 

             TOTAL PROTEIN (test code = PROT) 6.2 gram/dL  6.4-8.2      L       

      

 

             ALBUMIN (test code = ALB) 2.6 g/dL     3.4-5.0      L             

 

             GLOBULIN (test code = GLOB) 3.6 gram/dL  2.7-4.2      N            

 

 

             ALBUMIN/GLOBULIN RATIO (test code = A/G) 0.7          0.75-1.50    

L             

 

             CALCIUM (test code = CA) 9.4 mg/dL    8.5-10.1     N             

 

             BILIRUBIN TOTAL (test code = BILT) 1.10 mg/dL   0.0-1.0      H     

        

 

             SGOT/AST (test code = AST) 16 IUnit/L   15-37        N             

 

             SGPT/ALT (test code = ALT) 21 IUnit/L   12-78        N             

 

             ALKALINE PHOSPHATASE TOTAL (test code = ALKP) 70 IUnit/L     

     N            **Note change 

in reference range due to change in reagent.**





COMPREHENSIVE METABOLIC [id number] 07:00:00* 



             Test Item    Value        Reference Range Interpretation Comments

 

             SODIUM (test code = NA) 138 mmol/L   136-145      N             

 

             POTASSIUM (test code = K) 3.6 mmol/L   3.5-5.1      N             

 

             CHLORIDE (test code = CL) 97.0 mmol/L         L             

 

             CARBON DIOXIDE (test code = CO2)  mmol/L      21-32                

      

 

             ANION GAP (test code = GAP)              10-20                     

 

 

             GLUCOSE (test code = GLU)  mg/dL                            

 

             BLOOD UREA NITROGEN (test code = BUN)  mg/dL       7-18            

           

 

             GLOMERULAR FILTRATION RATE (test code = GFR)  mL/min      >=60     

                  

 

             CREATININE (test code = CREAT)  mg/dL       0.7-1.3                

    

 

             BUN/CREATININE RATIO (test code = BUN/CREA)              10-20     

                 

 

             TOTAL PROTEIN (test code = PROT)  gram/dL     6.4-8.2              

      

 

             ALBUMIN (test code = ALB)  g/dL        3.4-5.0                    

 

             GLOBULIN (test code = GLOB)  gram/dL     2.7-4.2                   

 

 

             ALBUMIN/GLOBULIN RATIO (test code = A/G)              0.75-1.50    

              

 

             CALCIUM (test code = CA)  mg/dL       8.5-10.1                   

 

             BILIRUBIN TOTAL (test code = BILT)  mg/dL       0.0-1.0            

        

 

             SGOT/AST (test code = AST)  IUnit/L     15-37                      

 

             SGPT/ALT (test code = ALT)  IUnit/L     12-78                      

 

             ALKALINE PHOSPHATASE TOTAL (test code = ALKP)  IUnit/L       

                   





[id number] 20:06:00* 



             Test Item    Value        Reference Range Interpretation Comments

 

             GLUBED (test code = GLUBED) 175 mg/dL           H            

Performed by certified  

at [facility]





[id number] 15:22:00* 



             Test Item    Value        Reference Range Interpretation Comments

 

             GLUBED (test code = GLUBED) 124 mg/dL           H            

Performed by certified  

at [facility]





[id number] 11:39:00* 



             Test Item    Value        Reference Range Interpretation Comments

 

             GLUBED (test code = GLUBED) 129 mg/dL           H            

Performed by certified  

at [facility]





[id number] 07:40:00* 



             Test Item    Value        Reference Range Interpretation Comments

 

             GLUBED (test code = GLUBED) 101 mg/dL           N            

Performed by certified  

at [facility]





COMPREHENSIVE METABOLIC [id number] 07:36:00* 



             Test Item    Value        Reference Range Interpretation Comments

 

             SODIUM (test code = NA) 139 mmol/L   136-145      N             

 

             POTASSIUM (test code = K) 3.9 mmol/L   3.5-5.1      N             

 

             CHLORIDE (test code = CL) 100.0 mmol/L        N             

 

             CARBON DIOXIDE (test code = CO2) 30.0 mmol/L  21-32        N       

      

 

             ANION GAP (test code = GAP) 12.9         10-20        N            

 

 

             GLUCOSE (test code = GLU) 92 mg/dL            N             

 

             BLOOD UREA NITROGEN (test code = BUN) 19 mg/dL     7-18         H  

           

 

             GLOMERULAR FILTRATION RATE (test code = GFR) 54 mL/min    >=60     

                 Estimated GFR by 

using Modified MDRD formula.Chronic kidney disease is defined as either kidney 
damageor GFR <60 mL/min/1.73 m2 for >3 months.

 

             CREATININE (test code = CREAT) 1.30 mg/dL   0.7-1.3      N         

    

 

             BUN/CREATININE RATIO (test code = BUN/CREA) 15.0         10-20     

   N             

 

             TOTAL PROTEIN (test code = PROT) 6.0 gram/dL  6.4-8.2      L       

      

 

             ALBUMIN (test code = ALB) 2.6 g/dL     3.4-5.0      L             

 

             GLOBULIN (test code = GLOB) 3.4 gram/dL  2.7-4.2      N            

 

 

             ALBUMIN/GLOBULIN RATIO (test code = A/G) 0.8          0.75-1.50    

N             

 

             CALCIUM (test code = CA) 8.9 mg/dL    8.5-10.1     N             

 

             BILIRUBIN TOTAL (test code = BILT) 1.20 mg/dL   0.0-1.0      H     

        

 

             SGOT/AST (test code = AST) 16 IUnit/L   15-37        N             

 

             SGPT/ALT (test code = ALT) 21 IUnit/L   12-78        N             

 

             ALKALINE PHOSPHATASE TOTAL (test code = ALKP) 69 IUnit/L     

     N            **Note change 

in reference range due to change in reagent.**





BASIC METABOLIC [id number] 07:28:00* 



             Test Item    Value        Reference Range Interpretation Comments

 

             SODIUM (test code = NA) 137 mmol/L   136-145      N             

 

             POTASSIUM (test code = K) 3.8 mmol/L   3.5-5.1      N             

 

             CHLORIDE (test code = CL) 101.0 mmol/L        N             

 

             CARBON DIOXIDE (test code = CO2) 27.0 mmol/L  21-32        N       

      

 

             ANION GAP (test code = GAP) 12.8         10-20        N            

 

 

             GLUCOSE (test code = GLU) 93 mg/dL            N             

 

             BLOOD UREA NITROGEN (test code = BUN) 20 mg/dL     7-18         H  

           

 

             GLOMERULAR FILTRATION RATE (test code = GFR) 54 mL/min    >=60     

                 Estimated GFR by 

using Modified MDRD formula.Chronic kidney disease is defined as either kidney 
damageor GFR <60 mL/min/1.73 m2 for >3 months.

 

             CREATININE (test code = CREAT) 1.30 mg/dL   0.7-1.3      N         

    

 

             BUN/CREATININE RATIO (test code = BUN/CREA) 15.4         10-20     

   N             

 

             CALCIUM (test code = CA) 8.9 mg/dL    8.5-10.1     N             





COMPREHENSIVE METABOLIC [id number] 07:24:00* 



             Test Item    Value        Reference Range Interpretation Comments

 

             SODIUM (test code = NA) 139 mmol/L   136-145      N             

 

             POTASSIUM (test code = K) 3.9 mmol/L   3.5-5.1      N             

 

             CHLORIDE (test code = CL) 100.0 mmol/L        N             

 

             CARBON DIOXIDE (test code = CO2)  mmol/L      21-32                

      

 

             ANION GAP (test code = GAP)              10-20                     

 

 

             GLUCOSE (test code = GLU)  mg/dL                            

 

             BLOOD UREA NITROGEN (test code = BUN)  mg/dL       7-18            

           

 

             GLOMERULAR FILTRATION RATE (test code = GFR)  mL/min      >=60     

                  

 

             CREATININE (test code = CREAT)  mg/dL       0.7-1.3                

    

 

             BUN/CREATININE RATIO (test code = BUN/CREA)              10-20     

                 

 

             TOTAL PROTEIN (test code = PROT)  gram/dL     6.4-8.2              

      

 

             ALBUMIN (test code = ALB)  g/dL        3.4-5.0                    

 

             GLOBULIN (test code = GLOB)  gram/dL     2.7-4.2                   

 

 

             ALBUMIN/GLOBULIN RATIO (test code = A/G)              0.75-1.50    

              

 

             CALCIUM (test code = CA)  mg/dL       8.5-10.1                   

 

             BILIRUBIN TOTAL (test code = BILT)  mg/dL       0.0-1.0            

        

 

             SGOT/AST (test code = AST)  IUnit/L     15-37                      

 

             SGPT/ALT (test code = ALT)  IUnit/L     12-78                      

 

             ALKALINE PHOSPHATASE TOTAL (test code = ALKP)  IUnit/L       

                   





BASIC METABOLIC [id number] 07:22:00* 



             Test Item    Value        Reference Range Interpretation Comments

 

             SODIUM (test code = NA) 137 mmol/L   136-145      N             

 

             POTASSIUM (test code = K) 3.8 mmol/L   3.5-5.1      N             

 

             CHLORIDE (test code = CL) 101.0 mmol/L        N             

 

             CARBON DIOXIDE (test code = CO2)  mmol/L      21-32                

      

 

             ANION GAP (test code = GAP)              10-20                     

 

 

             GLUCOSE (test code = GLU)  mg/dL                            

 

             BLOOD UREA NITROGEN (test code = BUN)  mg/dL       7-18            

           

 

             GLOMERULAR FILTRATION RATE (test code = GFR)  mL/min      >=60     

                  

 

             CREATININE (test code = CREAT)  mg/dL       0.7-1.3                

    

 

             BUN/CREATININE RATIO (test code = BUN/CREA)              10-20     

                 

 

             CALCIUM (test code = CA)  mg/dL       8.5-10.1                   





CBC W/AUTO [id number] 07:21:00* 



             Test Item    Value        Reference Range Interpretation Comments

 

             WHITE BLOOD CELL (test code = WBC) 6.3 K/mm3    4.5-12.5     N     

        

 

             RED BLOOD CELL (test code = RBC) 4.51 mill/mm3 4.0-5.8      N      

       

 

             HEMOGLOBIN (test code = HGB) 12.1 gram/dL 13.0-17.5    L           

  

 

             HEMATOCRIT (test code = HCT) 38.2 %       42.0-52.0    L           

  

 

             MEAN CELL VOLUME (test code = MCV) 84.7 fL      80-98        N     

        

 

             MEAN CELL HGB (test code = MCH) 26.8 picogram 27.0-33.0    L       

      

 

             MEAN CELL HGB CONCETRATION (test code = MCHC) 31.7 gram/dL 33.0-36.

0    L             

 

             RED CELL DISTRIBUTION WIDTH (test code = RDW) 16.0 %       11.6-16.

2    N             

 

             RED CELL DISTRIBUTION WIDTH SD (test code = RDW-SD) 49.3 fL      37

.0-51.0    N             

 

             PLATELET COUNT (test code = PLT) 258 K/mm3    150-450      N       

      

 

             MEAN PLATELET VOLUME (test code = MPV) 10.9 fL      6.7-11.0     N 

            

 

             NEUTROPHIL % (test code = NT%) 64.5 %       39.0-69.0    N         

    

 

             IMMATURE GRANULOCYTE % (test code = IG%) 0.3 %        0.0-5.0      

N             

 

             LYMPHOCYTE % (test code = LY%) 12.2 %       25.0-55.0    L         

    

 

             MONOCYTE % (test code = MO%) 13.9 %       0.0-10.0     H           

  

 

             EOSINOPHIL % (test code = EO%) 7.7 %        0.0-5.0      H         

    

 

             BASOPHIL % (test code = BA%) 1.4 %        0.0-1.0      H           

  

 

             NUCLEATED RBC % (test code = NRBC%) 0.0 %        0-0          N    

         

 

             NEUTROPHIL # (test code = NT#) 4.08 K/mm3   1.8-7.7      N         

    

 

             IMMATURE GRANULOCYTE # (test code = IG#) 0.02 x10 3/uL 0-0.03      

 N             

 

             LYMPHOCYTE # (test code = LY#) 0.77 K/mm3   1.0-5.0      L         

    

 

             MONOCYTE # (test code = MO#) 0.88 K/mm3   0-0.8        H           

  

 

             EOSINOPHIL # (test code = EO#) 0.49 K/mm3   0.0-0.5      N         

    

 

             BASOPHIL # (test code = BA#) 0.09 K/mm3   0.0-0.2      N           

  

 

             NUCLEATED RBC # (test code = NRBC#) 0.00 K/mm3   0.0-0.1      N    

         

 

             MANUAL DIFF REQUIRED (test code = MDIFF) NO                        

              





[id number] 20:17:00* 



             Test Item    Value        Reference Range Interpretation Comments

 

             GLUBED (test code = GLUBED) 170 mg/dL           H            

Performed by certified  

at [facility]





[id number] 16:46:00* 



             Test Item    Value        Reference Range Interpretation Comments

 

             GLUBED (test code = GLUBED) 146 mg/dL           H            

Performed by certified  

at [facility]





[id number] 11:35:00* 



             Test Item    Value        Reference Range Interpretation Comments

 

             GLUBED (test code = GLUBED) 161 mg/dL           H            

Performed by certified  

at [facility]





[id number] 08:09:00* 



             Test Item    Value        Reference Range Interpretation Comments

 

             GLUBED (test code = GLUBED) 112 mg/dL           H            

Performed by certified  

at [facility]





[id number] 02:32:00* 



             Test Item    Value        Reference Range Interpretation Comments

 

             GLUBED (test code = GLUBED) 99 mg/dL            N            

Performed by certified  at

 [facility]





[id number] 22:36:00* 



             Test Item    Value        Reference Range Interpretation Comments

 

             TROPONIN-I (test code = TROPI) 0.054 ng/mL  0-0.045      HH        

   PREVIOUSLY CALLED





COMMENTS TO PHLEBOTOMIST: COLLECT 3 HOURS AFTER PREVIOUS                        
   [id number] 21:08:00* 



             Test Item    Value        Reference Range Interpretation Comments

 

             GLUBED (test code = GLUBED) 126 mg/dL           H            

[facility]





[id number] 17:04:00* 



             Test Item    Value        Reference Range Interpretation Comments

 

             GLUBED (test code = GLUBED) 80 mg/dL            N            

Performed by certified  at

 [facility]





[id number] 17:04:00* 



             Test Item    Value        Reference Range Interpretation Comments

 

             GLUBED (test code = GLUBED) 80 mg/dL            N            

Performed by certified  at

 [facility]





B-TYPE NATRIURETIC [id number] 11:32:00* 



             Test Item    Value        Reference Range Interpretation Comments

 

             B-TYPE NATRIURETIC PEPTIDE (test code = BNP) 2460.39 pgram/mL 0-100

        H             





BASIC METABOLIC [id number] 11:21:00* 



             Test Item    Value        Reference Range Interpretation Comments

 

             SODIUM (test code = NA) 139 mmol/L   136-145      N             

 

             POTASSIUM (test code = K) 4.2 mmol/L   3.5-5.1      N             

 

             CHLORIDE (test code = CL) 102.0 mmol/L        N             

 

             CARBON DIOXIDE (test code = CO2) 31.0 mmol/L  21-32        N       

      

 

             ANION GAP (test code = GAP) 10.2         10-20        N            

 

 

             GLUCOSE (test code = GLU) 60 mg/dL            L             

 

             BLOOD UREA NITROGEN (test code = BUN) 13 mg/dL     7-18         N  

           

 

             GLOMERULAR FILTRATION RATE (test code = GFR) 60 mL/min    >=60     

                 Estimated GFR by 

using Modified MDRD formula.Chronic kidney disease is defined as either kidney 
damageor GFR <60 mL/min/1.73 m2 for >3 months.

 

             CREATININE (test code = CREAT) 1.20 mg/dL   0.7-1.3      N         

    

 

             BUN/CREATININE RATIO (test code = BUN/CREA) 11.1         10-20     

   N             

 

             CALCIUM (test code = CA) 8.9 mg/dL    8.5-10.1     N             





HEPATIC FUNCTION [id number] 11:21:00* 



             Test Item    Value        Reference Range Interpretation Comments

 

             TOTAL PROTEIN (test code = PROT) 7.1 gram/dL  6.4-8.2      N       

      

 

             ALBUMIN (test code = ALB) 2.8 g/dL     3.4-5.0      L             

 

             GLOBULIN (test code = GLOB) 4.3 gram/dL  2.7-4.2      H            

 

 

             ALBUMIN/GLOBULIN RATIO (test code = A/G) 0.7          0.75-1.50    

L             

 

             BILIRUBIN TOTAL (test code = BILT) 1.50 mg/dL   0.0-1.0      H     

        

 

             BILIRUBIN DIRECT (test code = BILD) 0.41 mg/dL   0.0-0.20     H    

         

 

             SGOT/AST (test code = AST) 29 IUnit/L   15-37        N             

 

             SGPT/ALT (test code = ALT) 28 IUnit/L   12-78        N             

 

             ALKALINE PHOSPHATASE TOTAL (test code = ALKP) 78 IUnit/L     

     N            **Note change 

in reference range due to change in reagent.**





[id number] 11:21:00* 



             Test Item    Value        Reference Range Interpretation Comments

 

             LIPASE (test code = LIP) 97 U/L       73.0-393.0   N             





[id number] 11:21:00* 



             Test Item    Value        Reference Range Interpretation Comments

 

             TROPONIN-I (test code = TROPI) 0.061 ng/mL  0-0.045      HH        

   Results called to [id number] 

by [email or web address] 20 [id number] results verified and read back by Nurse? Y





BASIC METABOLIC [id number] 11:11:00* 



             Test Item    Value        Reference Range Interpretation Comments

 

             SODIUM (test code = NA) 139 mmol/L   136-145      N             

 

             POTASSIUM (test code = K) 4.2 mmol/L   3.5-5.1      N             

 

             CHLORIDE (test code = CL) 102.0 mmol/L        N             

 

             CARBON DIOXIDE (test code = CO2)  mmol/L      21-32                

      

 

             ANION GAP (test code = GAP)              10-20                     

 

 

             GLUCOSE (test code = GLU)  mg/dL                            

 

             BLOOD UREA NITROGEN (test code = BUN)  mg/dL       7-18            

           

 

             GLOMERULAR FILTRATION RATE (test code = GFR)  mL/min      >=60     

                  

 

             CREATININE (test code = CREAT)  mg/dL       0.7-1.3                

    

 

             BUN/CREATININE RATIO (test code = BUN/CREA)              10-20     

                 

 

             CALCIUM (test code = CA)  mg/dL       8.5-10.1                   





HEPATIC FUNCTION [id number] 11:11:00* 



             Test Item    Value        Reference Range Interpretation Comments

 

             TOTAL PROTEIN (test code = PROT)  gram/dL     6.4-8.2              

      

 

             ALBUMIN (test code = ALB)  g/dL        3.4-5.0                    

 

             GLOBULIN (test code = GLOB)  gram/dL     2.7-4.2                   

 

 

             ALBUMIN/GLOBULIN RATIO (test code = A/G)              0.75-1.50    

              

 

             BILIRUBIN TOTAL (test code = BILT)  mg/dL       0.0-1.0            

        

 

             BILIRUBIN DIRECT (test code = BILD)  mg/dL       0.0-0.20          

         

 

             SGOT/AST (test code = AST)  IUnit/L     15-37                      

 

             SGPT/ALT (test code = ALT)  IUnit/L     12-78                      

 

             ALKALINE PHOSPHATASE TOTAL (test code = ALKP)  IUnit/L       

                   





[id number] 11:11:00* 



             Test Item    Value        Reference Range Interpretation Comments

 

             LIPASE (test code = LIP)  U/L         73.0-393.0                 





[id number] 11:11:00* 



             Test Item    Value        Reference Range Interpretation Comments

 

             TROPONIN-I (test code = TROPI)  ng/mL       0-0.045                

    





PROTHROMBIN [id number] 11:06:00* 



             Test Item    Value        Reference Range Interpretation Comments

 

             PROTHROMBIN TIME PATIENT (test code = PTP) 14.4 seconds 9.0-14.0   

  H             

 

             INTERNATIONAL NORMAL RATIO (test code = INR) 1.2          0.8-1.2  

    N            The therapeutic range

 for oral anticoagulant therapy form[employer] indications is an international 
normalized ratio (INR)of between 2.0 and 3.0.  The recommended therapeutic 
INRrange for various clinical situations is listed 
below:_________________________________________________________Clinical 
Situation                          INR 
range_________________________________________________________ Pulmonary e
mbolism treatment              (2.0-3.0)Venous thrombosis treatmentVenous 
thrombosis prophylaxis (high risk surgery)Prevention of systemic embolism from: 
        Acute myocardial infarction         Valvular heart disease         
Atrial fibrillation Mechanical prosthetic heart valves          (2.5-3.5)





IS PATIENT ON ANTICOAGULANTS? NTHROMBOPLASTIN TIME [id number] 11:06:00* 



             Test Item    Value        Reference Range Interpretation Comments

 

             THROMBOPLASTIN TIME PARTIAL (test code = PTT) 27.8 seconds 23.0-37.

0    N             





IS PATIENT ON ANTICOAGULANTS? NURINALYSIS [id number] 10:55:00* 



             Test Item    Value        Reference Range Interpretation Comments

 

             UA COLOR (test code = COLU) Light-Yellow YELLOW                    

 

 

             UA APPEARANCE (test code = APPU) CLEAR        CLEAR                

      

 

             UA GLUCOSE DIPSTICK (test code = DGLUU) NEGATIVE mg/dL NEGATIVE    

               

 

             UA BILIRUBIN DIPSTICK (test code = BILU) NEGATIVE mg/dL NEGATIVE   

                

 

             UA KETONE DIPSTICK (test code = KETU) NEGATIVE mg/dL NEGATIVE      

             

 

             UA SPECIFIC GRAVITY (test code = SGU) 1.006        1.001-1.035     

           

 

             UA BLOOD DIPSTICK (test code = PATRICIO) Negative mg/dL NEGATIVE        

           

 

             UA PH DIPSTICK (test code = LAWRENCE) 6.0          5.0-8.0              

      

 

             UA PROTEIN DIPSTICK (test code = PROU) NEGATIVE mg/dL NEGATIVE     

              

 

             UA UROBILINIOGEN DIPSTICK (test code = URO) Normal mg/dL NEGATIVE  

                 

 

             UA NITRITE DIPSTICK (test code = ELAYNE) NEGATIVE     NEGATIVE       

            

 

             UA LEUKOCYTE ESTERASE W REFLEX (test code = LEUUR) NEGATIVE Taya/uL 

NEGATIVE                   

 

             UA WBC (test code = WBCU) 0-5 per HPF  0-5                        

 

             UA RBC (test code = RBCU) 0-3 #/HPF    0-5                        

 

             UA EPITHELIAL CELLS (test code = EPIU) None seen per HPF FEW       

                 

 

             UA BACTERIA (test code = BACU) NONE SEEN #/HPF NONE                

       

 

             UA MUCUS (test code = MUCU) FEW #/LPF    FEW                       

 





Urine Source? Clean CatchCBC W/O [id number] 10:50:00* 



             Test Item    Value        Reference Range Interpretation Comments

 

             WHITE BLOOD CELL (test code = WBC) 7.8 K/mm3    4.5-12.5     N     

        

 

             RED BLOOD CELL (test code = RBC) 4.70 mill/mm3 4.0-5.8      N      

       

 

             HEMOGLOBIN (test code = HGB) 12.9 gram/dL 13.0-17.5    L           

  

 

             HEMATOCRIT (test code = HCT) 39.8 %       42.0-52.0    L           

  

 

             MEAN CELL VOLUME (test code = MCV) 84.7 fL      80-98        N     

        

 

             MEAN CELL HGB (test code = MCH) 27.4 picogram 27.0-33.0    N       

      

 

             MEAN CELL HGB CONCETRATION (test code = MCHC) 32.4 gram/dL 33.0-36.

0    L             

 

             RED CELL DISTRIBUTION WIDTH (test code = RDW) 16.0 %       11.6-16.

2    N             

 

             PLATELET COUNT (test code = PLT) 277 K/mm3    150-450      N       

      

 

             MEAN PLATELET VOLUME (test code = MPV) 10.6 fL      6.7-11.0     N 

            





- XR CHEST 1 -80-24 10:49:00 FAX: Aedla Coello [phone]
    [place]:    St: PRE FAX:         Haley Munoz  DO                 
------------------------------------------------------------------------------- 
 Name:   JUSTIN MACK           [facility]                     
: 1950  Age/S: 69/M           [address] [facility]                Unit #: 
[id number]      Loc: [facility]  [place]  [address]              Phys: 
Haley Munoz DO                                                 Acct: 
[id number] Dis Date:               Status: PRE ER                             
    PHONE #: [phone]     Exam Date:     2020     1043                
   FAX #: [phone]     Reason: chf                                          
      EXAMS:                                               CPT CODE:      
[id number] XR CHEST 1 V                               [address]806                    
HISTORY: CHF.               COMPARISON: 2019.               Location: 
[facility].               Brachiocephalic stent noted on the left side. Interstitial 
infiltrates       or edema. Trace left effusion. Dependent changes. 
Cardiomegaly.                 IMPRESSION:                   Mild interstitial e
rosemary or infiltrate.          ** Electronically Signed by VINOD Patino on  at 1049 **                      Reported and signed by: ISMA Hawkins                           CC: Adela Bañuelos MD; Haley Munoz DO     
                                                                                
          Technologist: Fauzia SANDOVAL(CHRIS)                                   SAHRA
rnscrd Date/Time/By: 2020 (2393) : By: [email or web address]           Orig Print D/T:
 S: 2020 (5760)                         PAGE  1                       Sign
ed Report                               COMPREHENSIVE METABOLIC [id number] 
06:39:00* 



             Test Item    Value        Reference Range Interpretation Comments

 

             SODIUM (test code = NA) 142 mmol/L   136-145      N             

 

             POTASSIUM (test code = K) 4.4 mmol/L   3.5-5.1      N             

 

             CHLORIDE (test code = CL) 107.0 mmol/L        N             

 

             CARBON DIOXIDE (test code = CO2) 29.0 mmol/L  21-32        N       

      

 

             ANION GAP (test code = GAP) 10.4         10-20        N            

 

 

             GLUCOSE (test code = GLU) 156 mg/dL           H             

 

             BLOOD UREA NITROGEN (test code = BUN) 15 mg/dL     7-18         N  

           

 

             GLOMERULAR FILTRATION RATE (test code = GFR) 60 mL/min    >=60     

                 Estimated GFR by 

using Modified MDRD formula.Chronic kidney disease is defined as either kidney 
damageor GFR <60 mL/min/1.73 m2 for >3 months.

 

             CREATININE (test code = CREAT) 1.20 mg/dL   0.7-1.3      N         

    

 

             BUN/CREATININE RATIO (test code = BUN/CREA) 12.4         10-20     

   N             

 

             TOTAL PROTEIN (test code = PROT) 6.0 gram/dL  6.4-8.2      L       

      

 

             ALBUMIN (test code = ALB) 2.5 g/dL     3.4-5.0      L             

 

             GLOBULIN (test code = GLOB) 3.5 gram/dL  2.7-4.2      N            

 

 

             ALBUMIN/GLOBULIN RATIO (test code = A/G) 0.7          0.75-1.50    

L             

 

             CALCIUM (test code = CA) 8.1 mg/dL    8.5-10.1     L             

 

             BILIRUBIN TOTAL (test code = BILT) 0.60 mg/dL   0.0-1.0      N     

        

 

             SGOT/AST (test code = AST) 9 IUnit/L    15-37        L             

 

             SGPT/ALT (test code = ALT) 17 IUnit/L   12-78        N             

 

             ALKALINE PHOSPHATASE TOTAL (test code = ALKP) 50 IUnit/L     

     N            **Note change 

in reference range due to change in reagent.**





COMPREHENSIVE METABOLIC [id number] 06:36:00* 



             Test Item    Value        Reference Range Interpretation Comments

 

             SODIUM (test code = NA) 142 mmol/L   136-145      N             

 

             POTASSIUM (test code = K) 4.4 mmol/L   3.5-5.1      N             

 

             CHLORIDE (test code = CL) 107.0 mmol/L        N             

 

             CARBON DIOXIDE (test code = CO2)  mmol/L      21-32                

      

 

             ANION GAP (test code = GAP)              10-20                     

 

 

             GLUCOSE (test code = GLU)  mg/dL                            

 

             BLOOD UREA NITROGEN (test code = BUN)  mg/dL       7-18            

           

 

             GLOMERULAR FILTRATION RATE (test code = GFR)  mL/min      >=60     

                  

 

             CREATININE (test code = CREAT)  mg/dL       0.7-1.3                

    

 

             BUN/CREATININE RATIO (test code = BUN/CREA)              10-20     

                 

 

             TOTAL PROTEIN (test code = PROT)  gram/dL     6.4-8.2              

      

 

             ALBUMIN (test code = ALB)  g/dL        3.4-5.0                    

 

             GLOBULIN (test code = GLOB)  gram/dL     2.7-4.2                   

 

 

             ALBUMIN/GLOBULIN RATIO (test code = A/G)              0.75-1.50    

              

 

             CALCIUM (test code = CA)  mg/dL       8.5-10.1                   

 

             BILIRUBIN TOTAL (test code = BILT)  mg/dL       0.0-1.0            

        

 

             SGOT/AST (test code = AST)  IUnit/L     15-37                      

 

             SGPT/ALT (test code = ALT)  IUnit/L     12-78                      

 

             ALKALINE PHOSPHATASE TOTAL (test code = ALKP)  IUnit/L       

                   





[id number] 06:20:00* 



             Test Item    Value        Reference Range Interpretation Comments

 

             GLUBED (test code = GLUBED) 150 mg/dL           H            

Performed by certified  

at [facility]





CBC W/AUTO [id number] 05:54:00* 



             Test Item    Value        Reference Range Interpretation Comments

 

             WHITE BLOOD CELL (test code = WBC) 8.0 K/mm3    4.5-12.5     N     

        

 

             RED BLOOD CELL (test code = RBC) 4.48 mill/mm3 4.0-5.8      N      

       

 

             HEMOGLOBIN (test code = HGB) 13.4 gram/dL 13.0-17.5    N           

  

 

             HEMATOCRIT (test code = HCT) 40.7 %       42.0-52.0    L           

  

 

             MEAN CELL VOLUME (test code = MCV) 90.8 fL      80-98        N     

        

 

             MEAN CELL HGB (test code = MCH) 29.9 picogram 27.0-33.0    N       

      

 

             MEAN CELL HGB CONCETRATION (test code = MCHC) 32.9 gram/dL 33.0-36.

0    L             

 

             RED CELL DISTRIBUTION WIDTH (test code = RDW) 12.3 %       11.6-16.

2    N             

 

             RED CELL DISTRIBUTION WIDTH SD (test code = RDW-SD) 40.8 fL      37

.0-51.0    N             

 

             PLATELET COUNT (test code = PLT) 165 K/mm3    150-450      N       

      

 

             MEAN PLATELET VOLUME (test code = MPV) 12.1 fL      6.7-11.0     H 

            

 

             NEUTROPHIL % (test code = NT%) 68.9 %       39.0-69.0    N         

    

 

             IMMATURE GRANULOCYTE % (test code = IG%) 0.4 %        0.0-5.0      

N             

 

             LYMPHOCYTE % (test code = LY%) 16.4 %       25.0-55.0    L         

    

 

             MONOCYTE % (test code = MO%) 8.9 %        0.0-10.0     N           

  

 

             EOSINOPHIL % (test code = EO%) 4.6 %        0.0-5.0      N         

    

 

             BASOPHIL % (test code = BA%) 0.8 %        0.0-1.0      N           

  

 

             NUCLEATED RBC % (test code = NRBC%) 0.0 %        0-0          N    

         

 

             NEUTROPHIL # (test code = NT#) 5.51 K/mm3   1.8-7.7      N         

    

 

             IMMATURE GRANULOCYTE # (test code = IG#) 0.03 x10 3/uL 0-0.03      

 N             

 

             LYMPHOCYTE # (test code = LY#) 1.31 K/mm3   1.0-5.0      N         

    

 

             MONOCYTE # (test code = MO#) 0.71 K/mm3   0-0.8        N           

  

 

             EOSINOPHIL # (test code = EO#) 0.37 K/mm3   0.0-0.5      N         

    

 

             BASOPHIL # (test code = BA#) 0.06 K/mm3   0.0-0.2      N           

  

 

             NUCLEATED RBC # (test code = NRBC#) 0.00 K/mm3   0.0-0.1      N    

         





[id number] 22:09:00* 



             Test Item    Value        Reference Range Interpretation Comments

 

             GLUBED (test code = GLUBED) 339 mg/dL           H            

Performed by certified  

at [facility]





COAGULATION TIME [id number] 10:37:00* 



             Test Item    Value        Reference Range Interpretation Comments

 

             COAGULATION TIME ACTIVATED (test code = ACT) 147 seconds  62.8-88.0

    H             





[id number] 09:03:00* 



             Test Item    Value        Reference Range Interpretation Comments

 

             GLUBED (test code = GLUBED) 197 mg/dL           H            

Performed by certified  

at [facility]





COMPREHENSIVE METABOLIC [id number] 10:46:00* 



             Test Item    Value        Reference Range Interpretation Comments

 

             SODIUM (test code = NA) 140 mmol/L   136-145      N             

 

             POTASSIUM (test code = K) 4.4 mmol/L   3.5-5.1      N             

 

             CHLORIDE (test code = CL) 105.0 mmol/L        N             

 

             CARBON DIOXIDE (test code = CO2) 28.0 mmol/L  21-32        N       

      

 

             ANION GAP (test code = GAP) 11.4         10-20        N            

 

 

             GLUCOSE (test code = GLU) 207 mg/dL           H             

 

             BLOOD UREA NITROGEN (test code = BUN) 17 mg/dL     7-18         N  

           

 

             GLOMERULAR FILTRATION RATE (test code = GFR) 55 mL/min    >=60     

                 Estimated GFR by 

using Modified MDRD formula.Chronic kidney disease is defined as either kidney 
damageor GFR <60 mL/min/1.73 m2 for >3 months.

 

             CREATININE (test code = CREAT) 1.30 mg/dL   0.7-1.3      N         

    

 

             BUN/CREATININE RATIO (test code = BUN/CREA) 13.0         10-20     

   N             

 

             TOTAL PROTEIN (test code = PROT) 7.6 gram/dL  6.4-8.2      N       

      

 

             ALBUMIN (test code = ALB) 3.3 g/dL     3.4-5.0      L             

 

             GLOBULIN (test code = GLOB) 4.3 gram/dL  2.7-4.2      H            

 

 

             ALBUMIN/GLOBULIN RATIO (test code = A/G) 0.8          0.75-1.50    

N             

 

             CALCIUM (test code = CA) 8.9 mg/dL    8.5-10.1     N             

 

             BILIRUBIN TOTAL (test code = BILT) 0.90 mg/dL   0.0-1.0      N     

        

 

             SGOT/AST (test code = AST) 11 IUnit/L   15-37        L             

 

             SGPT/ALT (test code = ALT) 24 IUnit/L   12-78        N             

 

             ALKALINE PHOSPHATASE TOTAL (test code = ALKP) 65 IUnit/L     

     N            **Note change 

in reference range due to change in reagent.**





LIPID PROFILE (CORONARY RISK)2019 10:46:00* 



             Test Item    Value        Reference Range Interpretation Comments

 

             TRIGLYCERIDES (test code = TRIG) 97 mg/dL            N       

      

 

             CHOLESTEROL (test code = CHOL) 134 mg/dL    0-200        N         

    

 

             CHOLESTEROL/HDL RATIO (test code = CHOLHDL) 3.0 RATIO    0-4.9     

   N            RISK ASSOCIATED 

WITH CHOL/HDL RATIOS:     Risk          Male       Female1/2 AVERAGE        3.43
        3.27AVERAGE            4.97        4.442X AVERAGE         9.55        
7.053X AVERAGE         23.39      11.04 REFERENCE VALUE IS RELATED TO RISK 
LEVELS ASRECOMMENDED BY THE SCOTTY. HEART, LUNG, AND BLOOD INST.

 

             HDL CHOLESTEROL (test code = HDL) 42 mg/dL     40-60        N      

       

 

             LIPOPROTEIN LDL (test code = LDL) 80 mg/dL     100-129      L      

      

===========================================================Reference Interval:  
        mg/dL          
mmol/L-----------------------------------------------------------Optimal        
              <100           <2.6Near/above optimal          100-129        2.6-
3.3Borderline High             130-159        3.4-4.1High                       
 160-189        4.1-4.9Very High                    &gt;=190          
>=4.9========= This LDL result is a direct measurement.=========





COMPREHENSIVE METABOLIC [id number] 10:40:00* 



             Test Item    Value        Reference Range Interpretation Comments

 

             SODIUM (test code = NA) 140 mmol/L   136-145      N             

 

             POTASSIUM (test code = K) 4.4 mmol/L   3.5-5.1      N             

 

             CHLORIDE (test code = CL) 105.0 mmol/L        N             

 

             CARBON DIOXIDE (test code = CO2)  mmol/L      21-32                

      

 

             ANION GAP (test code = GAP)              10-20                     

 

 

             GLUCOSE (test code = GLU)  mg/dL                            

 

             BLOOD UREA NITROGEN (test code = BUN)  mg/dL       7-18            

           

 

             GLOMERULAR FILTRATION RATE (test code = GFR)  mL/min      >=60     

                  

 

             CREATININE (test code = CREAT)  mg/dL       0.7-1.3                

    

 

             BUN/CREATININE RATIO (test code = BUN/CREA)              10-20     

                 

 

             TOTAL PROTEIN (test code = PROT)  gram/dL     6.4-8.2              

      

 

             ALBUMIN (test code = ALB)  g/dL        3.4-5.0                    

 

             GLOBULIN (test code = GLOB)  gram/dL     2.7-4.2                   

 

 

             ALBUMIN/GLOBULIN RATIO (test code = A/G)              0.75-1.50    

              

 

             CALCIUM (test code = CA)  mg/dL       8.5-10.1                   

 

             BILIRUBIN TOTAL (test code = BILT)  mg/dL       0.0-1.0            

        

 

             SGOT/AST (test code = AST)  IUnit/L     15-37                      

 

             SGPT/ALT (test code = ALT)  IUnit/L     12-78                      

 

             ALKALINE PHOSPHATASE TOTAL (test code = ALKP)  IUnit/L       

                   





LIPID PROFILE (CORONARY RISK)2019 10:40:00* 



             Test Item    Value        Reference Range Interpretation Comments

 

             TRIGLYCERIDES (test code = TRIG)  mg/dL                      

      

 

             CHOLESTEROL (test code = CHOL)  mg/dL       0-200                  

    

 

             CHOLESTEROL/HDL RATIO (test code = CHOLHDL)  RATIO       0-4.9     

                 

 

             HDL CHOLESTEROL (test code = HDL)  mg/dL       40-60               

       

 

             LIPOPROTEIN LDL (test code = LDL)  mg/dL       100-129             

       





CBC W/AUTO [id number] 10:15:00* 



             Test Item    Value        Reference Range Interpretation Comments

 

             WHITE BLOOD CELL (test code = WBC) 8.0 K/mm3    4.5-12.5     N     

        

 

             RED BLOOD CELL (test code = RBC) 5.38 mill/mm3 4.0-5.8      N      

       

 

             HEMOGLOBIN (test code = HGB) 16.0 gram/dL 13.0-17.5    N           

  

 

             HEMATOCRIT (test code = HCT) 48.0 %       42.0-52.0    N           

  

 

             MEAN CELL VOLUME (test code = MCV) 89.2 fL      80-98        N     

        

 

             MEAN CELL HGB (test code = MCH) 29.7 picogram 27.0-33.0    N       

      

 

             MEAN CELL HGB CONCETRATION (test code = MCHC) 33.3 gram/dL 33.0-36.

0    N             

 

             RED CELL DISTRIBUTION WIDTH (test code = RDW) 12.3 %       11.6-16.

2    N             

 

             RED CELL DISTRIBUTION WIDTH SD (test code = RDW-SD) 40.2 fL      37

.0-51.0    N             

 

             PLATELET COUNT (test code = PLT) 198 K/mm3    150-450      N       

      

 

             MEAN PLATELET VOLUME (test code = MPV) 11.5 fL      6.7-11.0     H 

            

 

             NEUTROPHIL % (test code = NT%) 71.8 %       39.0-69.0    H         

    

 

             IMMATURE GRANULOCYTE % (test code = IG%) 0.4 %        0.0-5.0      

N             

 

             LYMPHOCYTE % (test code = LY%) 16.8 %       25.0-55.0    L         

    

 

             MONOCYTE % (test code = MO%) 6.8 %        0.0-10.0     N           

  

 

             EOSINOPHIL % (test code = EO%) 3.4 %        0.0-5.0      N         

    

 

             BASOPHIL % (test code = BA%) 0.8 %        0.0-1.0      N           

  

 

             NUCLEATED RBC % (test code = NRBC%) 0.0 %        0-0          N    

         

 

             NEUTROPHIL # (test code = NT#) 5.74 K/mm3   1.8-7.7      N         

    

 

             IMMATURE GRANULOCYTE # (test code = IG#) 0.03 x10 3/uL 0-0.03      

 N             

 

             LYMPHOCYTE # (test code = LY#) 1.34 K/mm3   1.0-5.0      N         

    

 

             MONOCYTE # (test code = MO#) 0.54 K/mm3   0-0.8        N           

  

 

             EOSINOPHIL # (test code = EO#) 0.27 K/mm3   0.0-0.5      N         

    

 

             BASOPHIL # (test code = BA#) 0.06 K/mm3   0.0-0.2      N           

  

 

             NUCLEATED RBC # (test code = NRBC#) 0.00 K/mm3   0.0-0.1      N    

         

 

             MANUAL DIFF REQUIRED (test code = MDIFF) NO                        

              





CBC W/AUTO [id number] 10:06:00* 



             Test Item    Value        Reference Range Interpretation Comments

 

             WHITE BLOOD CELL (test code = WBC)  K/mm3       4.5-12.5           

        

 

             RED BLOOD CELL (test code = RBC)  mill/mm3    4.0-5.8              

      

 

             HEMOGLOBIN (test code = HGB) 16.0 gram/dL 13.0-17.5    N           

  

 

             HEMATOCRIT (test code = HCT) 48.0 %       42.0-52.0    N           

  

 

             MEAN CELL VOLUME (test code = MCV)  fL          80-98              

        

 

             MEAN CELL HGB (test code = MCH)  picogram    27.0-33.0             

     

 

             MEAN CELL HGB CONCETRATION (test code = MCHC)  gram/dL     33.0-36.

0                  

 

             RED CELL DISTRIBUTION WIDTH (test code = RDW)  %           11.6-16.

2                  

 

             RED CELL DISTRIBUTION WIDTH SD (test code = RDW-SD)  fL          37

.0-51.0                  

 

             PLATELET COUNT (test code = PLT)  K/mm3       150-450              

      

 

             MEAN PLATELET VOLUME (test code = MPV)  fL          6.7-11.0       

            

 

             NEUTROPHIL % (test code = NT%)  %           39.0-69.0              

    

 

             IMMATURE GRANULOCYTE % (test code = IG%)  %           0.0-5.0      

              

 

             LYMPHOCYTE % (test code = LY%)  %           25.0-55.0              

    

 

             MONOCYTE % (test code = MO%)  %           0.0-10.0                 

  

 

             EOSINOPHIL % (test code = EO%)  %           0.0-5.0                

    

 

             BASOPHIL % (test code = BA%)  %           0.0-1.0                  

  

 

             NEUTROPHIL # (test code = NT#)  K/mm3       1.8-7.7                

    

 

             LYMPHOCYTE # (test code = LY#)  K/mm3       1.0-5.0                

    

 

             MONOCYTE # (test code = MO#)  K/mm3       0-0.8                    

  

 

             EOSINOPHIL # (test code = EO#)  K/mm3       0.0-0.5                

    

 

             BASOPHIL # (test code = BA#)  K/mm3       0.0-0.2                  

  





COAGULATION TIME [id number] 14:10:00* 



             Test Item    Value        Reference Range Interpretation Comments

 

             COAGULATION TIME ACTIVATED (test code = ACT) 124 seconds  62.8-88.0

    H             





[id number] 11:53:00* 



             Test Item    Value        Reference Range Interpretation Comments

 

             GLUBED (test code = GLUBED) 208 mg/dL           H            

Performed by certified  

at [facility]





BASIC METABOLIC [id number] 05:25:00* 



             Test Item    Value        Reference Range Interpretation Comments

 

             SODIUM (test code = NA) 140 mmol/L   136-145      N             

 

             POTASSIUM (test code = K) 4.0 mmol/L   3.5-5.1      N             

 

             CHLORIDE (test code = CL) 106.0 mmol/L        N             

 

             CARBON DIOXIDE (test code = CO2)  mmol/L      21-32                

      

 

             ANION GAP (test code = GAP)              10-20                     

 

 

             GLUCOSE (test code = GLU)  mg/dL                            

 

             BLOOD UREA NITROGEN (test code = BUN)  mg/dL       7-18            

           

 

             GLOMERULAR FILTRATION RATE (test code = GFR)  mL/min      >=60     

                  

 

             CREATININE (test code = CREAT)  mg/dL       0.7-1.3                

    

 

             BUN/CREATININE RATIO (test code = BUN/CREA)              10-20     

                 

 

             CALCIUM (test code = CA)  mg/dL       8.5-10.1                   





BASIC METABOLIC [id number] 05:25:00* 



             Test Item    Value        Reference Range Interpretation Comments

 

             SODIUM (test code = NA) 140 mmol/L   136-145      N             

 

             POTASSIUM (test code = K) 4.0 mmol/L   3.5-5.1      N             

 

             CHLORIDE (test code = CL) 106.0 mmol/L        N             

 

             CARBON DIOXIDE (test code = CO2) 28.0 mmol/L  21-32        N       

      

 

             ANION GAP (test code = GAP) 10.0         10-20        N            

 

 

             GLUCOSE (test code = GLU) 162 mg/dL           H             

 

             BLOOD UREA NITROGEN (test code = BUN) 15 mg/dL     7-18         N  

           

 

             GLOMERULAR FILTRATION RATE (test code = GFR) > 60 mL/min  >=60     

                 Estimated GFR by

 using Modified MDRD formula.Chronic kidney disease is defined as either kidney 
damageor GFR <60 mL/min/1.73 m2 for >3 months.

 

             CREATININE (test code = CREAT) 1.10 mg/dL   0.7-1.3      N         

    

 

             BUN/CREATININE RATIO (test code = BUN/CREA) 13.6         10-20     

   N             

 

             CALCIUM (test code = CA) 8.0 mg/dL    8.5-10.1     L             





[id number] 05:17:00* 



             Test Item    Value        Reference Range Interpretation Comments

 

             GLUBED (test code = GLUBED) 165 mg/dL           H            

Performed by certified  

at [facility]





CBC W/AUTO [id number] 04:58:00* 



             Test Item    Value        Reference Range Interpretation Comments

 

             WHITE BLOOD CELL (test code = WBC) 7.9 K/mm3    4.5-12.5     N     

        

 

             RED BLOOD CELL (test code = RBC) 4.73 mill/mm3 4.0-5.8      N      

       

 

             HEMOGLOBIN (test code = HGB) 13.9 gram/dL 13.0-17.5    N           

  

 

             HEMATOCRIT (test code = HCT) 43.1 %       42.0-52.0    N           

  

 

             MEAN CELL VOLUME (test code = MCV) 91.1 fL      80-98        N     

        

 

             MEAN CELL HGB (test code = MCH) 29.4 picogram 27.0-33.0    N       

      

 

             MEAN CELL HGB CONCETRATION (test code = MCHC) 32.3 gram/dL 33.0-36.

0    L             

 

             RED CELL DISTRIBUTION WIDTH (test code = RDW) 12.1 %       11.6-16.

2    N             

 

             RED CELL DISTRIBUTION WIDTH SD (test code = RDW-SD) 40.4 fL      37

.0-51.0    N             

 

             PLATELET COUNT (test code = PLT) 175 K/mm3    150-450      N       

      

 

             MEAN PLATELET VOLUME (test code = MPV) 11.6 fL      6.7-11.0     H 

            

 

             NEUTROPHIL % (test code = NT%) 64.8 %       39.0-69.0    N         

    

 

             IMMATURE GRANULOCYTE % (test code = IG%) 0.5 %        0.0-5.0      

N             

 

             LYMPHOCYTE % (test code = LY%) 19.5 %       25.0-55.0    L         

    

 

             MONOCYTE % (test code = MO%) 8.8 %        0.0-10.0     N           

  

 

             EOSINOPHIL % (test code = EO%) 5.5 %        0.0-5.0      H         

    

 

             BASOPHIL % (test code = BA%) 0.9 %        0.0-1.0      N           

  

 

             NUCLEATED RBC % (test code = NRBC%) 0.0 %        0-0          N    

         

 

             NEUTROPHIL # (test code = NT#) 5.10 K/mm3   1.8-7.7      N         

    

 

             IMMATURE GRANULOCYTE # (test code = IG#) 0.04 x10 3/uL 0-0.03      

 H             

 

             LYMPHOCYTE # (test code = LY#) 1.53 K/mm3   1.0-5.0      N         

    

 

             MONOCYTE # (test code = MO#) 0.69 K/mm3   0-0.8        N           

  

 

             EOSINOPHIL # (test code = EO#) 0.43 K/mm3   0.0-0.5      N         

    

 

             BASOPHIL # (test code = BA#) 0.07 K/mm3   0.0-0.2      N           

  

 

             NUCLEATED RBC # (test code = NRBC#) 0.00 K/mm3   0.0-0.1      N    

         





CBC W/AUTO [id number] 04:52:00* 



             Test Item    Value        Reference Range Interpretation Comments

 

             WHITE BLOOD CELL (test code = WBC)  K/mm3       4.5-12.5           

        

 

             RED BLOOD CELL (test code = RBC)  mill/mm3    4.0-5.8              

      

 

             HEMOGLOBIN (test code = HGB) 13.9 gram/dL 13.0-17.5    N           

  

 

             HEMATOCRIT (test code = HCT) 43.1 %       42.0-52.0    N           

  

 

             MEAN CELL VOLUME (test code = MCV)  fL          80-98              

        

 

             MEAN CELL HGB (test code = MCH)  picogram    27.0-33.0             

     

 

             MEAN CELL HGB CONCETRATION (test code = MCHC)  gram/dL     33.0-36.

0                  

 

             RED CELL DISTRIBUTION WIDTH (test code = RDW)  %           11.6-16.

2                  

 

             RED CELL DISTRIBUTION WIDTH SD (test code = RDW-SD)  fL          37

.0-51.0                  

 

             PLATELET COUNT (test code = PLT)  K/mm3       150-450              

      

 

             MEAN PLATELET VOLUME (test code = MPV)  fL          6.7-11.0       

            

 

             NEUTROPHIL % (test code = NT%)  %           39.0-69.0              

    

 

             IMMATURE GRANULOCYTE % (test code = IG%)  %           0.0-5.0      

              

 

             LYMPHOCYTE % (test code = LY%)  %           25.0-55.0              

    

 

             MONOCYTE % (test code = MO%)  %           0.0-10.0                 

  

 

             EOSINOPHIL % (test code = EO%)  %           0.0-5.0                

    

 

             BASOPHIL % (test code = BA%)  %           0.0-1.0                  

  

 

             NEUTROPHIL # (test code = NT#)  K/mm3       1.8-7.7                

    

 

             LYMPHOCYTE # (test code = LY#)  K/mm3       1.0-5.0                

    

 

             MONOCYTE # (test code = MO#)  K/mm3       0-0.8                    

  

 

             EOSINOPHIL # (test code = EO#)  K/mm3       0.0-0.5                

    

 

             BASOPHIL # (test code = BA#)  K/mm3       0.0-0.2                  

  





COAGULATION TIME [id number] 23:16:00* 



             Test Item    Value        Reference Range Interpretation Comments

 

             COAGULATION TIME ACTIVATED (test code = ACT) 197 seconds  62.8-88.0

    H             





[id number] 21:21:00* 



             Test Item    Value        Reference Range Interpretation Comments

 

             GLUBED (test code = GLUBED) 184 mg/dL           H            

Performed by certified  

at [facility]





[id number] 15:59:00* 



             Test Item    Value        Reference Range Interpretation Comments

 

             GLUBED (test code = GLUBED) 266 mg/dL           H            

Performed by certified  

at [facility]





[id number] 11:28:00* 



             Test Item    Value        Reference Range Interpretation Comments

 

             GLUBED (test code = GLUBED) 191 mg/dL           H            

Performed by certified  

at [facility]





[id number] 07:58:00* 



             Test Item    Value        Reference Range Interpretation Comments

 

             GLUBED (test code = GLUBED) 232 mg/dL           H            

Performed by certified  

at [facility]





COMPREHENSIVE METABOLIC [id number] 11:54:00* 



             Test Item    Value        Reference Range Interpretation Comments

 

             SODIUM (test code = NA) 138 mmol/L   136-145      N             

 

             POTASSIUM (test code = K) 4.5 mmol/L   3.5-5.1      N             

 

             CHLORIDE (test code = CL) 104.0 mmol/L        N             

 

             CARBON DIOXIDE (test code = CO2) 26.0 mmol/L  21-32        N       

      

 

             ANION GAP (test code = GAP) 12.5         10-20        N            

 

 

             GLUCOSE (test code = GLU) 248 mg/dL           H             

 

             BLOOD UREA NITROGEN (test code = BUN) 19 mg/dL     7-18         H  

           

 

             GLOMERULAR FILTRATION RATE (test code = GFR) 50 mL/min    >=60     

                 Estimated GFR by 

using Modified MDRD formula.Chronic kidney disease is defined as either kidney 
damageor GFR <60 mL/min/1.73 m2 for >3 months.

 

             CREATININE (test code = CREAT) 1.40 mg/dL   0.7-1.3      H         

    

 

             BUN/CREATININE RATIO (test code = BUN/CREA) 13.6         10-20     

   N             

 

             TOTAL PROTEIN (test code = PROT) 7.6 gram/dL  6.4-8.2      N       

      

 

             ALBUMIN (test code = ALB) 3.3 g/dL     3.4-5.0      L             

 

             GLOBULIN (test code = GLOB) 4.3 gram/dL  2.7-4.2      H            

 

 

             ALBUMIN/GLOBULIN RATIO (test code = A/G) 0.8          0.75-1.50    

N             

 

             CALCIUM (test code = CA) 9.1 mg/dL    8.5-10.1     N             

 

             BILIRUBIN TOTAL (test code = BILT) 0.80 mg/dL   0.0-1.0      N     

        

 

             SGOT/AST (test code = AST) 15 IUnit/L   15-37        N             

 

             SGPT/ALT (test code = ALT) 30 IUnit/L   12-78        N             

 

             ALKALINE PHOSPHATASE TOTAL (test code = ALKP) 68 IUnit/L     

     N            **Note change 

in reference range due to change in reagent.**





LIPID PROFILE (CORONARY RISK)2019 11:54:00* 



             Test Item    Value        Reference Range Interpretation Comments

 

             TRIGLYCERIDES (test code = TRIG) 97 mg/dL            N       

      

 

             CHOLESTEROL (test code = CHOL) 125 mg/dL    0-200        N         

    

 

             CHOLESTEROL/HDL RATIO (test code = CHOLHDL) 2.0 RATIO    0-4.9     

   N            RISK ASSOCIATED 

WITH CHOL/HDL RATIOS:     Risk          Male       Female1/2 AVERAGE        3.43
        3.27AVERAGE            4.97        4.442X AVERAGE         9.55        
7.053X AVERAGE         23.39      11.04 REFERENCE VALUE IS RELATED TO RISK 
LEVELS ASRECOMMENDED BY THE SCOTTY. HEART, LUNG, AND BLOOD INST.

 

             HDL CHOLESTEROL (test code = HDL) 47 mg/dL     40-60        N      

       

 

             LIPOPROTEIN LDL (test code = LDL) 70 mg/dL     100-129      L      

      

===========================================================Reference Interval:  
        mg/dL          
mmol/L-----------------------------------------------------------Optimal        
              <100           <2.6Near/above optimal          100-129        2.6-
3.3Borderline High             130-159        3.4-4.1High                       
 160-189        4.1-4.9Very High                    &gt;=190          
>=4.9========= This LDL result is a direct measurement.=========





THYROID STIMULATING [id number] 11:54:00* 



             Test Item    Value        Reference Range Interpretation Comments

 

             THYROID STIMULATING HORMONE (test code = TSH) 1.120 uIU/mL 0.36-3.7

4    N            TSH 

REFERENCE RANGES:  EUTHYROID:     0.35 - 4.3 mIU/mL                       HYPO  
   :     > 5.5      mIU/mL                       HYPER    :     < 0.35     
mIU/mL





COMPREHENSIVE METABOLIC [id number] 11:40:00* 



             Test Item    Value        Reference Range Interpretation Comments

 

             SODIUM (test code = NA) 138 mmol/L   136-145      N             

 

             POTASSIUM (test code = K) 4.5 mmol/L   3.5-5.1      N             

 

             CHLORIDE (test code = CL) 104.0 mmol/L        N             

 

             CARBON DIOXIDE (test code = CO2)  mmol/L      21-32                

      

 

             ANION GAP (test code = GAP)              10-20                     

 

 

             GLUCOSE (test code = GLU)  mg/dL                            

 

             BLOOD UREA NITROGEN (test code = BUN)  mg/dL       7-18            

           

 

             GLOMERULAR FILTRATION RATE (test code = GFR)  mL/min      >=60     

                  

 

             CREATININE (test code = CREAT)  mg/dL       0.7-1.3                

    

 

             BUN/CREATININE RATIO (test code = BUN/CREA)              10-20     

                 

 

             TOTAL PROTEIN (test code = PROT)  gram/dL     6.4-8.2              

      

 

             ALBUMIN (test code = ALB)  g/dL        3.4-5.0                    

 

             GLOBULIN (test code = GLOB)  gram/dL     2.7-4.2                   

 

 

             ALBUMIN/GLOBULIN RATIO (test code = A/G)              0.75-1.50    

              

 

             CALCIUM (test code = CA)  mg/dL       8.5-10.1                   

 

             BILIRUBIN TOTAL (test code = BILT)  mg/dL       0.0-1.0            

        

 

             SGOT/AST (test code = AST)  IUnit/L     15-37                      

 

             SGPT/ALT (test code = ALT)  IUnit/L     12-78                      

 

             ALKALINE PHOSPHATASE TOTAL (test code = ALKP)  IUnit/L       

                   





LIPID PROFILE (CORONARY RISK)2019 11:40:00* 



             Test Item    Value        Reference Range Interpretation Comments

 

             TRIGLYCERIDES (test code = TRIG)  mg/dL                      

      

 

             CHOLESTEROL (test code = CHOL)  mg/dL       0-200                  

    

 

             CHOLESTEROL/HDL RATIO (test code = CHOLHDL)  RATIO       0-4.9     

                 

 

             HDL CHOLESTEROL (test code = HDL)  mg/dL       40-60               

       

 

             LIPOPROTEIN LDL (test code = LDL)  mg/dL       100-129             

       





THYROID STIMULATING [id number] 11:40:00* 



             Test Item    Value        Reference Range Interpretation Comments

 

             THYROID STIMULATING HORMONE (test code = TSH)  uIU/mL      0.36-3.7

4                  





[id number] 11:06:00* 



             Test Item    Value        Reference Range Interpretation Comments

 

             GLYCOSYLATED HEMOGLOBIN (HA1C) (test code = GLYHGB) 10.8 % HbA1  4.

8-6.0      H             

 

             ESTIMATED AVERAGE GLUCOSE (test code = EAG) 263 MG/DL              

                 





CBC W/AUTO [id number] 10:48:00* 



             Test Item    Value        Reference Range Interpretation Comments

 

             WHITE BLOOD CELL (test code = WBC) 9.8 K/mm3    4.5-12.5     N     

        

 

             RED BLOOD CELL (test code = RBC) 5.46 mill/mm3 4.0-5.8      N      

       

 

             HEMOGLOBIN (test code = HGB) 16.1 gram/dL 13.0-17.5    N           

  

 

             HEMATOCRIT (test code = HCT) 48.5 %       42.0-52.0    N           

  

 

             MEAN CELL VOLUME (test code = MCV) 88.8 fL      80-98        N     

        

 

             MEAN CELL HGB (test code = MCH) 29.5 picogram 27.0-33.0    N       

      

 

             MEAN CELL HGB CONCETRATION (test code = MCHC) 33.2 gram/dL 33.0-36.

0    N             

 

             RED CELL DISTRIBUTION WIDTH (test code = RDW) 12.4 %       11.6-16.

2    N             

 

             RED CELL DISTRIBUTION WIDTH SD (test code = RDW-SD) 40.4 fL      37

.0-51.0    N             

 

             PLATELET COUNT (test code = PLT) 199 K/mm3    150-450      N       

      

 

             MEAN PLATELET VOLUME (test code = MPV) 11.7 fL      6.7-11.0     H 

            

 

             NEUTROPHIL % (test code = NT%) 77.0 %       39.0-69.0    H         

    

 

             IMMATURE GRANULOCYTE % (test code = IG%) 0.3 %        0.0-5.0      

N             

 

             LYMPHOCYTE % (test code = LY%) 12.2 %       25.0-55.0    L         

    

 

             MONOCYTE % (test code = MO%) 7.7 %        0.0-10.0     N           

  

 

             EOSINOPHIL % (test code = EO%) 2.0 %        0.0-5.0      N         

    

 

             BASOPHIL % (test code = BA%) 0.8 %        0.0-1.0      N           

  

 

             NUCLEATED RBC % (test code = NRBC%) 0.0 %        0-0          N    

         

 

             NEUTROPHIL # (test code = NT#) 7.51 K/mm3   1.8-7.7      N         

    

 

             IMMATURE GRANULOCYTE # (test code = IG#) 0.03 x10 3/uL 0-0.03      

 N             

 

             LYMPHOCYTE # (test code = LY#) 1.19 K/mm3   1.0-5.0      N         

    

 

             MONOCYTE # (test code = MO#) 0.75 K/mm3   0-0.8        N           

  

 

             EOSINOPHIL # (test code = EO#) 0.20 K/mm3   0.0-0.5      N         

    

 

             BASOPHIL # (test code = BA#) 0.08 K/mm3   0.0-0.2      N           

  

 

             NUCLEATED RBC # (test code = NRBC#) 0.00 K/mm3   0.0-0.1      N    

         

 

             MANUAL DIFF REQUIRED (test code = MDIFF) NO                        

              





- XR CHEST 2 -52-27 10:45:00 FAX: Adela Coello [phone]
    [place]: O   St: PRE FAX: Shaneka Higgins [phone]   
------------------------------------------------------------------------------- 
 Name:   JUSTIN MACK           [facility]                     
: 1950  Age/S: 68/M           [address] [facility]                Unit #: 
[id number]      Loc: [place]  [address]              Phys: 
Shaneka Hazel MD                                               Acct: 
[id number] Dis Date:               Status: PRE SDC                            
    PHONE #: [phone]     Exam Date:     2019     1045                
   FAX #: [phone]     Reason: PRE OP                                       
      EXAMS:                                               CPT CODE:      
[id number] XR CHEST 2 V                               [id number]                    
HISTORY: Preop.               COMPARISON chest x-ray from 2015.       
        AP and lateral view of the chest:               No acute infiltrates, 
effusion or congestion. Lung scarring. Stent in       the subclavian vessel on 
the left. Cardiac silhouette is mildly       enlarged.                 
IMPRESSION:                   No acute infiltrates, effusion or congestion.     
     ** Electronically Signed by VINOD Patino on 2019 at 1045 **     
                 Reported and signed by: Serafin Patino M.D.                     
     CC: Adela Bañuelos MD; Shaneka Hazel MD                            
                                                                 Technologist: 
RT William(R)                                 Trnscrd Date/Time/By: 
2019 (4913) : By: [email or web address]           Orig Print D/T: S: 2019 (8242)
                         PAGE  1                       Signed Report            
                   CBC W/AUTO [id number] 10:44:00* 



             Test Item    Value        Reference Range Interpretation Comments

 

             WHITE BLOOD CELL (test code = WBC)  K/mm3       4.5-12.5           

        

 

             RED BLOOD CELL (test code = RBC)  mill/mm3    4.0-5.8              

      

 

             HEMOGLOBIN (test code = HGB) 16.1 gram/dL 13.0-17.5    N           

  

 

             HEMATOCRIT (test code = HCT) 48.5 %       42.0-52.0    N           

  

 

             MEAN CELL VOLUME (test code = MCV)  fL          80-98              

        

 

             MEAN CELL HGB (test code = MCH)  picogram    27.0-33.0             

     

 

             MEAN CELL HGB CONCETRATION (test code = MCHC)  gram/dL     33.0-36.

0                  

 

             RED CELL DISTRIBUTION WIDTH (test code = RDW)  %           11.6-16.

2                  

 

             RED CELL DISTRIBUTION WIDTH SD (test code = RDW-SD)  fL          37

.0-51.0                  

 

             PLATELET COUNT (test code = PLT)  K/mm3       150-450              

      

 

             MEAN PLATELET VOLUME (test code = MPV)  fL          6.7-11.0       

            

 

             NEUTROPHIL % (test code = NT%)  %           39.0-69.0              

    

 

             IMMATURE GRANULOCYTE % (test code = IG%)  %           0.0-5.0      

              

 

             LYMPHOCYTE % (test code = LY%)  %           25.0-55.0              

    

 

             MONOCYTE % (test code = MO%)  %           0.0-10.0                 

  

 

             EOSINOPHIL % (test code = EO%)  %           0.0-5.0                

    

 

             BASOPHIL % (test code = BA%)  %           0.0-1.0                  

  

 

             NEUTROPHIL # (test code = NT#)  K/mm3       1.8-7.7                

    

 

             LYMPHOCYTE # (test code = LY#)  K/mm3       1.0-5.0                

    

 

             MONOCYTE # (test code = MO#)  K/mm3       0-0.8                    

  

 

             EOSINOPHIL # (test code = EO#)  K/mm3       0.0-0.5                

    

 

             BASOPHIL # (test code = BA#)  K/mm3       0.0-0.2

## 2020-08-28 NOTE — NUR
CALL PLACED OUT TO DR. MO, BUT WAS INFORMED DR. SHI IS COVERING AT THIS TIME, 
REGARDING SLIDING SCALE ORDER AND NPO STATUS. AWAITING CALLBACK.

## 2020-08-28 NOTE — EMERGENCY DEPARTMENT NOTE
History of Present Illnes


History of Present Illness


Chief Complaint:  Abdominal Complaints


History of Present Illness


This is a 69 year old  male arrives to the ED with right upper quadrant 

abdominal pain. Patient seen in Dr. Bañuelos's office instructed to come to the ER

to rule out acute cholecystitis. Patient states she's had pain for 3 or 4 days.


Historian:  Patient, Paramedic/EMS


Arrival Mode:  [facility]


EMS Treatment PTA:  See EMS Report


Onset (how long ago):  day(s)


Radiation:  Reports non-radiation


Severity:  moderate


Onset quality:  gradual


Duration (how long):  day(s)


Progression:  worsening


Chronicity:  new


Relieving factors:  none


Exacerbating factors:  eating





Past Medical/Family History


Physician Review


I have reviewed the patient's past medical and family history.  Any updates have

been documented here.





Past Medical History


Recent Fever:  No


Clinical Suspicion of Infectio:  Yes


New/Unexplained Change in Ment:  No





Social History


Smoking Cessation:  Former smoker


Alcohol Use:  Social





Review of Systems


Review of Systems


Constitutional:  Reports no symptoms


EENTM:  Reports no symptoms


Cardiovascular:  Reports no symptoms


Respiratory:  Reports no symptoms


Gastrointestinal:  Reports as per HPI, Reports abdominal pain


Genitourinary:  Reports no symptoms


Musculoskeletal:  Reports no symptoms


Integumentary:  Reports no symptoms


Neurological:  Reports no symptoms


Psychological:  Reports no symptoms


Endocrine:  Reports no symptoms


Hematological/Lymphatic:  Reports no symptoms





Physical Exam


Related Data


Allergies:  


Coded Allergies:  


     No Known Allergies (Unverified , 9/23/14)


Triage Vital Signs





Vital Signs








  Date Time  Temp Pulse Resp B/P (MAP) Pulse Ox O2 Delivery O2 Flow Rate FiO2


 


8/28/20 11:54 98.8 103 24 116/74 100 Room Air  








Vital signs reviewed:  Yes





Physical Exam


CONSTITUTIONAL





Constitutional:  Present well-developed, Present well-nourished


HENT


HENT:  Present normocephalic, Present atraumatic, Present oropharynx clear/mo

ist, Present nose normal


HENT L/R:  Present left ext ear normal, Present right ext ear normal


EYES





Eyes:  Reports PERRL, Reports scleral icterus


NECK


Neck:  Present ROM normal


PULMONARY


Pulmonary:  Present effort normal, Present respiratory distress


CARDIOVASCULAR





Cardiovascular:  Present regular rhythm, Present heart sounds normal, Present 

capillary refill normal, Present normal rate, Present tachycardia


GASTROINTESTINAL





Abdominal:  Present soft, Present bowel sounds normal, Present distension, 

Present tender


GENITOURINARY





Genitourinary:  Present exam deferred


SKIN


Skin:  Present warm, Present dry


MUSCULOSKELETAL





Musculoskeletal:  Present ROM normal


NEUROLOGICAL





Neurological:  Present alert, Present oriented x 3, Present no gross motor or 

sensory deficits


PSYCHOLOGICAL


Psychological:  Present mood/affect normal, Present judgement normal





Procedures


12 Lead ECG Interpretation


ECG Interpretation :  


   ECG:  ECG 1


   Prior ECG tracings:  reviewed


   Rhythm:  sinus rhythm


   Ectopy:  PVC's


   QRS axis:  left


   ST segments normal:  Yes


   T waves normal:  Yes


   Clinical Impression:  non-specific ECG





Critical Care Time


Total Critical Care Time (min):  65


Critical care time exclusive o:  separately billable procedures


Critcal care necessary due to:  sepsis





Assessment & Plan


Medical Decision Making


MDM


69-year-old male the ED with abdominal pain, sent from his office to the ED for 

further workup of acute cholecystitis.


Patient's lab work reviewed, no leukocytosis noted, however, mild elevation of T

bili noted. CT abdomen and pelvis shows unremarkable gallbladder pathology. 

Findings of abdominal ascites were noted and explained. Spoke to Dr. IZZY traore of Gen. surgery,  as well as patient's primary care 

physician Dr. JEANNETTE Bañuelos. Patient admitted for further workup and management. 

Malignancy, liver cirrhosis, hep C and other various intra-abdominal pathologies

are on the differential diagnosis.





Patient had SIRS criteria on arrival and severe sepsis was triggered at 1341


Blood cultures drawn, broad spectrum antibiotics in the form of Zosyn given at 

1234 completion of transfusion done at 1333


Repeat lactic acid improved


Source of infection noted to be intraabdominal 





Patient admitted





Assessment & Plan


Final Impression:  


(1) Hyperbilirubinemia


(2) Liver dysfunction


(3) Severe sepsis


(4) Ascites


Depart Disposition:  ADMITTED


Last Vital Signs











  Date Time  Temp Pulse Resp B/P (MAP) Pulse Ox O2 Delivery O2 Flow Rate FiO2


 


8/28/20 11:54 98.8 103 24 116/74 100 Room Air  








Home Meds


Reported Medications


[Fish Oil]   No Conflict Check, 1000 MG PO DAILY


   9/23/14


Aspirin (ASPIRIN EC) 81 Mg Tablet., 81 MG PO DAILY, #30 TAB


   9/23/14


Insulin Detemir* (LEVEMIR 3ML FLEXPEN*) 100 Units/Ml  Inj, 30 UNITS SQ BID


   9/23/14


Clopidogrel Bisulfate* (PLAVIX) 75 Mg Tablet, 75 MG PO DAILY, #30 TAB


   9/23/14


[Glimepiride]   No Conflict Check, 4 MG PO DAILY


   9/23/14


Lisinopril (LISINOPRIL) 10 Mg Tablet, 10 MG PO DAILY, #30 TAB


   9/23/14


Atorvastatin Calcium (LIPITOR) 40 Mg Tablet, 40 MG PO DAILY


   9/23/14


Medications in the ED





Sodium Chloride 1,000 ml @  0 mls/hr Q0M STAT IV ;  Start 8/28/20 at 11:54;  

Stop 8/28/20 at 11:56;  Status DC


Morphine Sulfate 4 mg ONCE  PRN IV SEVERE PAIN (7-10);  Start 8/28/20 at 12:00; 

 Stop 9/4/20 at 11:59


Ondansetron HCl 4 mg NOW  STAT IV ;  Start 8/28/20 at 11:54;  Stop 8/28/20 at 

12:00;  Status DC











KRISTI JAIMES,             Aug 28, 2020 12:15

## 2020-08-29 VITALS — DIASTOLIC BLOOD PRESSURE: 54 MMHG | SYSTOLIC BLOOD PRESSURE: 107 MMHG

## 2020-08-29 VITALS — DIASTOLIC BLOOD PRESSURE: 66 MMHG | SYSTOLIC BLOOD PRESSURE: 149 MMHG

## 2020-08-29 VITALS — DIASTOLIC BLOOD PRESSURE: 99 MMHG | SYSTOLIC BLOOD PRESSURE: 115 MMHG

## 2020-08-29 VITALS — DIASTOLIC BLOOD PRESSURE: 68 MMHG | SYSTOLIC BLOOD PRESSURE: 113 MMHG

## 2020-08-29 VITALS — SYSTOLIC BLOOD PRESSURE: 120 MMHG | DIASTOLIC BLOOD PRESSURE: 71 MMHG

## 2020-08-29 VITALS — SYSTOLIC BLOOD PRESSURE: 105 MMHG | DIASTOLIC BLOOD PRESSURE: 68 MMHG

## 2020-08-29 LAB
ALBUMIN SERPL-MCNC: 2.7 G/DL (ref 3.5–5)
ALBUMIN/GLOB SERPL: 0.8 {RATIO} (ref 0.8–2)
ALP SERPL-CCNC: 69 IU/L (ref 40–150)
ALT SERPL-CCNC: 12 IU/L (ref 0–55)
ANION GAP SERPL CALC-SCNC: 16.2 MMOL/L (ref 8–16)
BASOPHILS # BLD AUTO: 0.1 10*3/UL (ref 0–0.1)
BASOPHILS NFR BLD AUTO: 1.2 % (ref 0–1)
BUN SERPL-MCNC: 19 MG/DL (ref 7–26)
BUN/CREAT SERPL: 12 (ref 6–25)
CALCIUM SERPL-MCNC: 8.4 MG/DL (ref 8.4–10.2)
CHLORIDE SERPL-SCNC: 101 MMOL/L (ref 98–107)
CO2 SERPL-SCNC: 26 MMOL/L (ref 22–29)
DEPRECATED NEUTROPHILS # BLD AUTO: 4.5 10*3/UL (ref 2.1–6.9)
EGFRCR SERPLBLD CKD-EPI 2021: 41 ML/MIN (ref 60–?)
EOSINOPHIL # BLD AUTO: 0.6 10*3/UL (ref 0–0.4)
EOSINOPHIL NFR BLD AUTO: 8.4 % (ref 0–6)
ERYTHROCYTE [DISTWIDTH] IN CORD BLOOD: 17.9 % (ref 11.7–14.4)
FERRITIN SERPL-MCNC: 278.2 NG/ML (ref 21.81–274.66)
GLOBULIN PLAS-MCNC: 3.5 G/DL (ref 2.3–3.5)
GLUCOSE SERPLBLD-MCNC: 88 MG/DL (ref 74–118)
HCT VFR BLD AUTO: 33.7 % (ref 38.2–49.6)
HGB BLD-MCNC: 10.6 G/DL (ref 14–18)
IRON SERPL-MCNC: 33 UG/DL (ref 65–175)
LYMPHOCYTES # BLD: 0.6 10*3/UL (ref 1–3.2)
LYMPHOCYTES NFR BLD AUTO: 9.8 % (ref 18–39.1)
MCH RBC QN AUTO: 28.9 PG (ref 28–32)
MCHC RBC AUTO-ENTMCNC: 31.5 G/DL (ref 31–35)
MCV RBC AUTO: 91.8 FL (ref 81–99)
MONOCYTES # BLD AUTO: 0.8 10*3/UL (ref 0.2–0.8)
MONOCYTES NFR BLD AUTO: 11.4 % (ref 4.4–11.3)
NEUTS SEG NFR BLD AUTO: 68.9 % (ref 38.7–80)
PLATELET # BLD AUTO: 230 X10E3/UL (ref 140–360)
POTASSIUM SERPL-SCNC: 4.2 MMOL/L (ref 3.5–5.1)
RBC # BLD AUTO: 3.67 X10E6/UL (ref 4.3–5.7)
SODIUM SERPL-SCNC: 139 MMOL/L (ref 136–145)

## 2020-08-29 RX ADMIN — INSULIN LISPRO SCH UNIT: 100 INJECTION, SOLUTION INTRAVENOUS; SUBCUTANEOUS at 17:02

## 2020-08-29 RX ADMIN — INSULIN LISPRO SCH UNIT: 100 INJECTION, SOLUTION INTRAVENOUS; SUBCUTANEOUS at 11:30

## 2020-08-29 RX ADMIN — TAZOBACTAM SODIUM AND PIPERACILLIN SODIUM SCH MLS/HR: 375; 3 INJECTION, SOLUTION INTRAVENOUS at 23:45

## 2020-08-29 RX ADMIN — TAZOBACTAM SODIUM AND PIPERACILLIN SODIUM SCH MLS/HR: 375; 3 INJECTION, SOLUTION INTRAVENOUS at 12:25

## 2020-08-29 RX ADMIN — TAZOBACTAM SODIUM AND PIPERACILLIN SODIUM SCH MLS/HR: 375; 3 INJECTION, SOLUTION INTRAVENOUS at 18:25

## 2020-08-29 RX ADMIN — INSULIN LISPRO SCH UNIT: 100 INJECTION, SOLUTION INTRAVENOUS; SUBCUTANEOUS at 07:30

## 2020-08-29 RX ADMIN — INSULIN LISPRO SCH UNIT: 100 INJECTION, SOLUTION INTRAVENOUS; SUBCUTANEOUS at 20:55

## 2020-08-29 NOTE — NUR
REPORT GIVEN TO DAYSHIFT NURSE. ALERT AND RESTING IN BED. NO SIGNS IV INFILTRATION. BED 
LOCKED AND IN LOW POSITION. CALL LIGHT WITHIN REACH. BED ALARM ACTIVATED.

## 2020-08-29 NOTE — HISTORY AND PHYSICAL
HISTORY OF PRESENT ILLNESS:  The patient is a 69-year-old  male, who has

according to him no past medical history except for coronary artery bypass grafting,

came here sent by Dr. Cortez Bañuelos due to right upper quadrant abdominal pain.  The

patient is very argumentative and disrespectful during the interview. 

 

REVIEW OF SYSTEMS:

CARDIOVASCULAR:  No chest pain or palpitation. 

RESPIRATORY:  No shortness of breath.  No cough. 

GASTROINTESTINAL:  No nausea or vomiting.  No diarrhea.  He complains of right upper

quadrant pain, which is a lot better than when he came. 

GENITOURINARY:  No frequency or dysuria.

 

ALLERGIES:  HE SAID HE IS NOT ALLERGIC TO ANYTHING.

 

He says he drinks occasionally and he is a smoker.

 

PAST MEDICAL HISTORY:  He said that he had coronary artery bypass graft in the past.  He

does not remember he had a stroke or not. 

 

PHYSICAL EXAMINATION:

VITAL SIGNS:  Blood pressure 113/68, temperature 97.7, heart rate 85 per minute,

respiratory rate 22 per minute, oxygen saturation 97%. 

HEART:  Showed regular rhythm.  Normal S1, S2 sound. 

LUNGS:  Clear bilaterally. 

ABDOMEN:  Soft, nontender.  No distention.  No visceromegaly. 

EXTREMITIES:  Show no edema.

LABORATORY DATA:  On the CBC; white blood count 8.56, hemoglobin 10.6, hematocrit 33.7,

and platelet count 230,000.  On the BMP; sodium 139, potassium 4.2, chloride 101, CO2

26, BUN 19, creatinine 1.65, GFR 41, glucose 88, lactic acid 1.9, calcium 8.4, total

bilirubin 1.6.  AST 12, ALT 12, alkaline phosphatase 69, troponin 0.058, albumin 2.7,

total protein is 6.2, globulin 3.5, lipase is 23.  Urinalysis is essentially

unremarkable except for ketones and some hyaline casts.  Serology; coronavirus test is

done, report is pending.  On the CT of the abdomen showed moderate volume of abdominal

pelvic ascites. 

 

Stable micronodular contour noted on the liver, which can be seen in the setting of

hepatic dysfunction, reflux of contrast material is also noted within the hepatic veins,

which is nonspecific, probably can be seen in setting of right-sided heart dysfunction

__________ and coronary artery disease. 

 

FINAL IMPRESSION:  

1. Abdominal pain.

2. Cirrhosis.

3. Ascites.

4. Coronary artery disease, status post coronary artery bypass grafting.

5. Elevated liver function tests.

 

PLAN OF TREATMENT:  The patient was started on Zosyn 3.375 mg IV q.6 hours for concerns

about sepsis.  Blood culture still pending.  We will monitor his blood sugar before

meals and at bedtime.  Continue __________ q.4 hours as needed for hypertension.  The

patient does not know what medication he takes for high blood pressure, morphine 4 mg IV

as needed for severe pain has been given to the patient, Zofran 4 mg IV one time has

been given also.  We are going to put the patient on Tylenol 325 mg every 4 hours as

needed for pain.  The patient right now claimed that he does not have any severe pain.

__________ from the Gastroenterology point of view, iron, TIBC, ferritin, ceruloplasmin

level, hepatitis profile, antinuclear antibody have been ordered part of the workup for

elevated total bilirubin.  __________ from the Gastroenterology point of view a right

upper quadrant abdominal ultrasound has been ordered and Dr. Martínez has been consulted

for Infectious Disease point of view.  Blood cultures are still pending.  The patient

has no fever, but apparently the lactic acid was high when he came to the hospital. 

 

The patient is not very cooperative __________ questionnaire and getting upset during

the questionnaire. 

 

 

 

 

______________________________

MD EVELIN Culp/MARIO

D:  08/29/2020 11:33:52

T:  08/29/2020 14:24:34

Job #:  [id number]

## 2020-08-29 NOTE — NUR
infectious disease consultation



Reason for consultation right upper quadrant abdominal pain





The patient who is very pleasant 69-year-old male comes in with right-sided 
upper quadrant pain was severe enough that he had come to the emergency room 
but he is currently have no pain whatsoever there is no nausea no vomiting no 
diarrhea at the present time he still have some right upper quadrant discomfort

 The patient is a 69-year-old  male, who has

according to him no past medical history except for coronary artery bypass 
grafting,

came here sent by Dr. Cortez Bañuelos due to right upper quadrant abdominal pain.  
The

patient is very argumentative and disrespectful during the interview. 

 

REVIEW OF SYSTEMS:

CARDIOVASCULAR:  No chest pain or palpitation. 

RESPIRATORY:  No shortness of breath.  No cough. 

GASTROINTESTINAL:  No nausea or vomiting.  No diarrhea.  He complains of right 
upper

quadrant pain, which is a lot better than when he came. 

GENITOURINARY:  No frequency or dysuria.

 

ALLERGIES:  HE SAID HE IS NOT ALLERGIC TO ANYTHING.

 

He says he drinks occasionally and he is a smoker.

 

PAST MEDICAL HISTORY:  He said that he had coronary artery bypass graft in the 
past.  He

does not remember he had a stroke or not. 

 

PHYSICAL EXAMINATION:Is currently alert oriented does not seem to be in acute 
distress vital stable currently afebrile

VITAL SIGNS:  Blood pressure 113/68, temperature 97.7, heart rate 85 per 
minute,

respiratory rate 22 per minute, oxygen saturation 97%. 

HEART:  Showed regular rhythm.  Normal S1, S2 sound. 

LUNGS:  Clear bilaterally. 

ABDOMEN:  Soft, nontender.  No distention.  No visceromegaly. he does have 
right upper quadrant discomfort

EXTREMITIES:  Show no edema.

LABORATORY DATA:  On the CBC; white blood count 8.56, hemoglobin 10.6, 
hematocrit 33.7,

and platelet count 230,000.  On the BMP; sodium 139, potassium 4.2, chloride 
101, CO2

26, BUN 19, creatinine 1.65, GFR 41, glucose 88, lactic acid 1.9, calcium 8.4, 
total

bilirubin 1.6.  AST 12, ALT 12, alkaline phosphatase 69, troponin 0.058, 
albumin 2.7,

total protein is 6.2, globulin 3.5, lipase is 23.  Urinalysis is essentially

unremarkable except for ketones and some hyaline casts.  Serology; coronavirus 
test is

done, report is pending.  On the CT of the abdomen showed moderate volume of 
abdominal

pelvic ascites. 

 

Stable micronodular contour noted on the liver, which can be seen in the 
setting of

hepatic dysfunction, reflux of contrast material is also noted within the 
hepatic veins,

which is nonspecific, probably can be seen in setting of right-sided heart 
dysfunction and coronary artery disease. 

 

FINAL IMPRESSION:  

1. Abdominal pain. concern about cholelithiasis so far workup is negative we 
will discuss with surgery continue with Zosyn discussed with the patient

2. Cirrhosis.

3. Ascites.

4. Coronary artery disease, status post coronary artery bypass grafting.

5. Elevated liver function tests.I think is due to his liver cirrhosis recheck 
in the morning

Agree with Luis for now

## 2020-08-30 VITALS — SYSTOLIC BLOOD PRESSURE: 100 MMHG | DIASTOLIC BLOOD PRESSURE: 64 MMHG

## 2020-08-30 VITALS — SYSTOLIC BLOOD PRESSURE: 120 MMHG | DIASTOLIC BLOOD PRESSURE: 66 MMHG

## 2020-08-30 VITALS — DIASTOLIC BLOOD PRESSURE: 66 MMHG | SYSTOLIC BLOOD PRESSURE: 120 MMHG

## 2020-08-30 VITALS — SYSTOLIC BLOOD PRESSURE: 99 MMHG | DIASTOLIC BLOOD PRESSURE: 50 MMHG

## 2020-08-30 VITALS — SYSTOLIC BLOOD PRESSURE: 106 MMHG | DIASTOLIC BLOOD PRESSURE: 59 MMHG

## 2020-08-30 VITALS — DIASTOLIC BLOOD PRESSURE: 64 MMHG | SYSTOLIC BLOOD PRESSURE: 110 MMHG

## 2020-08-30 VITALS — DIASTOLIC BLOOD PRESSURE: 50 MMHG | SYSTOLIC BLOOD PRESSURE: 99 MMHG

## 2020-08-30 VITALS — SYSTOLIC BLOOD PRESSURE: 123 MMHG | DIASTOLIC BLOOD PRESSURE: 70 MMHG

## 2020-08-30 LAB
ALBUMIN SERPL-MCNC: 2.6 G/DL (ref 3.5–5)
ALP SERPL-CCNC: 66 IU/L (ref 40–150)
ALT SERPL-CCNC: 10 IU/L (ref 0–55)
BILIRUB CONJ SERPL-MCNC: 0.9 MG/DL (ref 0–0.5)

## 2020-08-30 RX ADMIN — INSULIN LISPRO SCH UNIT: 100 INJECTION, SOLUTION INTRAVENOUS; SUBCUTANEOUS at 07:30

## 2020-08-30 RX ADMIN — INSULIN LISPRO SCH UNIT: 100 INJECTION, SOLUTION INTRAVENOUS; SUBCUTANEOUS at 20:28

## 2020-08-30 RX ADMIN — TAZOBACTAM SODIUM AND PIPERACILLIN SODIUM SCH MLS/HR: 375; 3 INJECTION, SOLUTION INTRAVENOUS at 18:18

## 2020-08-30 RX ADMIN — INSULIN LISPRO SCH UNIT: 100 INJECTION, SOLUTION INTRAVENOUS; SUBCUTANEOUS at 11:30

## 2020-08-30 RX ADMIN — INSULIN LISPRO SCH UNIT: 100 INJECTION, SOLUTION INTRAVENOUS; SUBCUTANEOUS at 15:57

## 2020-08-30 RX ADMIN — TAZOBACTAM SODIUM AND PIPERACILLIN SODIUM SCH MLS/HR: 375; 3 INJECTION, SOLUTION INTRAVENOUS at 05:54

## 2020-08-30 RX ADMIN — TAZOBACTAM SODIUM AND PIPERACILLIN SODIUM SCH MLS/HR: 375; 3 INJECTION, SOLUTION INTRAVENOUS at 12:01

## 2020-08-30 NOTE — PROGRESS NOTE
DATE:  08/30/2020  

 

INTERNAL MEDICINE PROGRESS NOTE:  

 

SUBJECTIVE:  The patient is a little bit confused today.  Keeps asking same questions

over and over. 

 

OBJECTIVE:  VITAL SIGNS:  Blood pressure 123/70, temperature 37.9, heart rate 87 per

minute, respiratory rate is 18 per minute, oxygen saturation 93%. 

ABDOMEN:  Soft. 

EXTREMITIES:  Show no evidence of edema. 

NEUROLOGIC: No motor deficits.

 

LABORATORY DATA:  On the blood work; we have CBC; white blood count 6.56, hemoglobin

10.6, hematocrit 33.7, and platelet count 230,000, blood sugar 97.  AST 10, ALT 10.  We

are waiting for the final report on the hepatitis profile, antinuclear antibody,

ceruloplasmin level, antimitochondrial antibodies. 

 

FINAL IMPRESSION:  

1. Atypical abdominal pain, which has resolved.

2. Possible cirrhosis of the liver.

3. History of coronary artery disease.

 

PLAN OF TREATMENT:  We are going to continue current medication regimen.  We are going

to be waiting for Dr. Po Bañuelos to see the patient due to the cirrhosis of the

liver.  He has 

been empirically started on Zosyn.  Blood culture negative.  Continue to monitor blood

sugar before meals and at bedtime. Continue pain control. 

 

 

 

 

______________________________

MD EVELIN Culp/MARIO

D:  08/30/2020 12:53:24

T:  08/30/2020 13:05:23

Job #:  [id number]

## 2020-08-30 NOTE — NUR
Aaox3.no resp.distress.no pain voiced.bed alarm on.call light within reach.instructed to 
call for assistance as needed.pt denied any needs.

## 2020-08-30 NOTE — NUR
infectious disease per his note patient seen and examined chart reviewed events 
noted lab data reviewed



The patient is a little bit confused today.  Keeps asking same questions

over and over. 

 

OBJECTIVE:  VITAL SIGNS:  Blood pressure 123/70, temperature 37.9, heart rate 
87 per

minute, respiratory rate is 18 per minute, oxygen saturation 93%. 

ABDOMEN:  Soft. 

EXTREMITIES:  Show no evidence of edema. 

NEUROLOGIC:  No motor deficits.

 

LABORATORY DATA:  On the blood work; we have CBC; white blood count 6.56, 
hemoglobin

10.6, hematocrit 33.7, and platelet count 230,000, blood sugar 97.  AST 10, ALT 
10.  We

are waiting for the final report on the hepatitis profile, antinuclear 
antibody,

ceruloplasmin level, antimitochondrial antibodies. 

 

FINAL IMPRESSION:  

1. Atypical abdominal pain, which has resolved.

2.  cirrhosis of the liver.

3. History of coronary artery disease.

Stable subfascial did point of view could be discharged home off antibiotic

## 2020-08-30 NOTE — DIAGNOSTIC IMAGING REPORT
EXAM: Liver Ultrasound

INDICATION:    Cirrhosis

COMPARISON: CT abdomen and pelvis on 8/28/2020. 

TECHNIQUE: Transverse and longitudinal images of the liver were obtained. 



FINDINGS:     

Liver:

 Size: 15.4 cm in the right midclavicular line

Diffusely echogenic with nodular borders.



Gallbladder: Normal gallbladder with no stone or sludge. Gallbladder wall is

slightly thickened measuring 0.4 cm, likely due to portal hypertension with

ascites.



Bile Ducts:

     Intrahepatic Ducts: No dilatation

     Extrahepatic Ducts: Limited

Common bile duct has normal caliber measuring 0.3 cm.



Main portal vein measures 1.0 cm with hepatopedal flow.



The right kidney measures 4.1 x 5.3 x 6.0 cm.



The pancreas is not visualized due to obscuration from overlying bowel gas.



The abdominal aorta and inferior vena cava also obscured by bowel gas and not

visualized.



There is mild to moderate ascites.



IMPRESSION:

1.  Cirrhotic liver with mild to moderate ascites. 

2.  Slightly thickened gallbladder wall likely due to portal hypertension with

ascites. No evidence of cholelithiasis.



Signed by: Amanda Hyman MD on 8/30/2020 3:04 PM

## 2020-08-30 NOTE — NUR
bedside shift report given to PM nurse. pt awake, alert, oriented X3, no s/s of distress. no 
complaints at this time. IV patent, intact. pt in stable condition.

## 2020-08-30 NOTE — NUR
Received the pt in report.lyeing in the bed.stable condition.no pain voiced.aaox3.no 
resp.distress.call light within reach.instructed to call for assistance as needed.

## 2020-08-31 VITALS — DIASTOLIC BLOOD PRESSURE: 65 MMHG | SYSTOLIC BLOOD PRESSURE: 111 MMHG

## 2020-08-31 VITALS — SYSTOLIC BLOOD PRESSURE: 113 MMHG | DIASTOLIC BLOOD PRESSURE: 65 MMHG

## 2020-08-31 VITALS — DIASTOLIC BLOOD PRESSURE: 72 MMHG | SYSTOLIC BLOOD PRESSURE: 112 MMHG

## 2020-08-31 VITALS — SYSTOLIC BLOOD PRESSURE: 121 MMHG | DIASTOLIC BLOOD PRESSURE: 70 MMHG

## 2020-08-31 LAB
IRON SATN MFR SERPL: 12 % (ref 15–50)
IRON SERPL-MCNC: 33 UG/DL (ref 65–175)
TIBC SERPL-MCNC: 274 UG/DL (ref 261–478)
TRANSFERRIN SERPL-MCNC: 196 MG/DL (ref 174–364)

## 2020-08-31 RX ADMIN — TAZOBACTAM SODIUM AND PIPERACILLIN SODIUM SCH MLS/HR: 375; 3 INJECTION, SOLUTION INTRAVENOUS at 13:13

## 2020-08-31 RX ADMIN — TAZOBACTAM SODIUM AND PIPERACILLIN SODIUM SCH MLS/HR: 375; 3 INJECTION, SOLUTION INTRAVENOUS at 00:11

## 2020-08-31 RX ADMIN — INSULIN LISPRO SCH UNIT: 100 INJECTION, SOLUTION INTRAVENOUS; SUBCUTANEOUS at 07:30

## 2020-08-31 RX ADMIN — INSULIN LISPRO SCH UNIT: 100 INJECTION, SOLUTION INTRAVENOUS; SUBCUTANEOUS at 11:30

## 2020-08-31 RX ADMIN — TAZOBACTAM SODIUM AND PIPERACILLIN SODIUM SCH MLS/HR: 375; 3 INJECTION, SOLUTION INTRAVENOUS at 06:00

## 2020-08-31 NOTE — NUR
ASSUMED CARE. RESTING IN BED. NO DISTRESS NOTED. CALL LIGHT IN REACH. SIDE RAILS UP X2. BED 
LOW AND LOCKED.

## 2020-08-31 NOTE — PROGRESS NOTE
DATE:  08/31/2020  

 

SUBJECTIVE:  Mr. Cordova is doing well.  There is no new complaint.  Lab data reviewed.

Chart reviewed. 

 

PHYSICAL EXAMINATION:

GENERAL:  He is currently alert, oriented. 

VITAL SIGNS:  Stable, currently afebrile. 

HEENT:  __________. 

NECK:  Supple. 

CHEST:  Clear. 

HEART:  S1, S2. 

ABDOMEN:  Soft.

IMPRESSION AND PLAN:  Abdominal pain seems to be better.  No antibiotic.  The patient

will be discharged.  Follow up as outpatient. 

 

 

 

 

______________________________

MD MERNA Greer/MARIO

D:  08/31/2020 14:04:57

T:  08/31/2020 16:43:44

Job #:  [id number]